# Patient Record
Sex: FEMALE | Race: BLACK OR AFRICAN AMERICAN | URBAN - METROPOLITAN AREA
[De-identification: names, ages, dates, MRNs, and addresses within clinical notes are randomized per-mention and may not be internally consistent; named-entity substitution may affect disease eponyms.]

---

## 2017-02-02 ENCOUNTER — INPATIENT (INPATIENT)
Facility: HOSPITAL | Age: 52
LOS: 7 days | Discharge: NURSING, REHAB OR LONG-TERM CARE | DRG: 64 | End: 2017-02-10
Attending: PSYCHIATRY & NEUROLOGY | Admitting: PSYCHIATRY & NEUROLOGY
Payer: COMMERCIAL

## 2017-02-02 VITALS
RESPIRATION RATE: 18 BRPM | OXYGEN SATURATION: 97 % | TEMPERATURE: 98 F | SYSTOLIC BLOOD PRESSURE: 156 MMHG | DIASTOLIC BLOOD PRESSURE: 96 MMHG | HEART RATE: 93 BPM

## 2017-02-02 DIAGNOSIS — Z41.8 ENCOUNTER FOR OTHER PROCEDURES FOR PURPOSES OTHER THAN REMEDYING HEALTH STATE: ICD-10-CM

## 2017-02-02 DIAGNOSIS — R41.82 ALTERED MENTAL STATUS, UNSPECIFIED: ICD-10-CM

## 2017-02-02 DIAGNOSIS — R63.8 OTHER SYMPTOMS AND SIGNS CONCERNING FOOD AND FLUID INTAKE: ICD-10-CM

## 2017-02-02 DIAGNOSIS — N19 UNSPECIFIED KIDNEY FAILURE: ICD-10-CM

## 2017-02-02 LAB
ALBUMIN SERPL ELPH-MCNC: 4 G/DL — SIGNIFICANT CHANGE UP (ref 3.4–5)
ALP SERPL-CCNC: 119 U/L — SIGNIFICANT CHANGE UP (ref 40–120)
ALT FLD-CCNC: 41 U/L — SIGNIFICANT CHANGE UP (ref 12–42)
AMMONIA BLD-MCNC: 10 UMOL/L — LOW (ref 11–32)
ANION GAP SERPL CALC-SCNC: 15 MMOL/L — SIGNIFICANT CHANGE UP (ref 9–16)
APPEARANCE UR: CLEAR — SIGNIFICANT CHANGE UP
APPEARANCE UR: SIGNIFICANT CHANGE UP
AST SERPL-CCNC: 41 U/L — HIGH (ref 15–37)
BACTERIA # UR AUTO: PRESENT /HPF
BILIRUB SERPL-MCNC: 0.9 MG/DL — SIGNIFICANT CHANGE UP (ref 0.2–1.2)
BILIRUB UR-MCNC: (no result)
BILIRUB UR-MCNC: NEGATIVE — SIGNIFICANT CHANGE UP
BUN SERPL-MCNC: 59 MG/DL — HIGH (ref 7–23)
CALCIUM SERPL-MCNC: 9.6 MG/DL — SIGNIFICANT CHANGE UP (ref 8.5–10.5)
CHLORIDE SERPL-SCNC: 100 MMOL/L — SIGNIFICANT CHANGE UP (ref 96–108)
CO2 SERPL-SCNC: 22 MMOL/L — SIGNIFICANT CHANGE UP (ref 22–31)
COLOR SPEC: YELLOW — SIGNIFICANT CHANGE UP
COLOR SPEC: YELLOW — SIGNIFICANT CHANGE UP
COMMENT - URINE: SIGNIFICANT CHANGE UP
CREAT ?TM UR-MCNC: 190 MG/DL — SIGNIFICANT CHANGE UP
CREAT SERPL-MCNC: 3.75 MG/DL — HIGH (ref 0.5–1.3)
DIFF PNL FLD: (no result)
DIFF PNL FLD: SIGNIFICANT CHANGE UP
EPI CELLS # UR: SIGNIFICANT CHANGE UP /HPF
ETHANOL SERPL-MCNC: <3 MG/DL — SIGNIFICANT CHANGE UP
GLUCOSE SERPL-MCNC: 109 MG/DL — HIGH (ref 70–99)
GLUCOSE UR QL: NEGATIVE MG/DL — SIGNIFICANT CHANGE UP
GLUCOSE UR QL: NEGATIVE — SIGNIFICANT CHANGE UP
GRAN CASTS # UR COMP ASSIST: (no result) /LPF
HCT VFR BLD CALC: 31.4 % — LOW (ref 34.5–45)
HGB BLD-MCNC: 10.1 G/DL — LOW (ref 11.5–15.5)
HYALINE CASTS # UR AUTO: (no result) /LPF
KETONES UR-MCNC: (no result) MG/DL
KETONES UR-MCNC: NEGATIVE MG/DL — SIGNIFICANT CHANGE UP
LEUKOCYTE ESTERASE UR-ACNC: NEGATIVE — SIGNIFICANT CHANGE UP
LEUKOCYTE ESTERASE UR-ACNC: NEGATIVE — SIGNIFICANT CHANGE UP
MAGNESIUM SERPL-MCNC: 2 MG/DL — SIGNIFICANT CHANGE UP (ref 1.6–2.4)
MCHC RBC-ENTMCNC: 28.7 PG — SIGNIFICANT CHANGE UP (ref 27–34)
MCHC RBC-ENTMCNC: 32.2 G/DL — SIGNIFICANT CHANGE UP (ref 32–36)
MCV RBC AUTO: 89.2 FL — SIGNIFICANT CHANGE UP (ref 80–100)
NITRITE UR-MCNC: NEGATIVE — SIGNIFICANT CHANGE UP
NITRITE UR-MCNC: NEGATIVE — SIGNIFICANT CHANGE UP
OSMOLALITY UR: 559 MOSMOL/KG — SIGNIFICANT CHANGE UP (ref 100–650)
PCP SPEC-MCNC: SIGNIFICANT CHANGE UP
PH UR: 5 — SIGNIFICANT CHANGE UP (ref 4–8.1)
PH UR: 5.5 — SIGNIFICANT CHANGE UP (ref 4–8)
PHOSPHATE SERPL-MCNC: 6 MG/DL — HIGH (ref 2.5–4.5)
PLATELET # BLD AUTO: 308 K/UL — SIGNIFICANT CHANGE UP (ref 150–400)
POTASSIUM SERPL-MCNC: 4.1 MMOL/L — SIGNIFICANT CHANGE UP (ref 3.5–5.3)
POTASSIUM SERPL-SCNC: 4.1 MMOL/L — SIGNIFICANT CHANGE UP (ref 3.5–5.3)
PROT SERPL-MCNC: 8.9 G/DL — HIGH (ref 6.4–8.2)
PROT UR-MCNC: 30 MG/DL
PROT UR-MCNC: 30 MG/DL — SIGNIFICANT CHANGE UP
RBC # BLD: 3.52 M/UL — LOW (ref 3.8–5.2)
RBC # FLD: 21.2 % — HIGH (ref 10.3–16.9)
RBC CASTS # UR COMP ASSIST: < 5 /HPF — SIGNIFICANT CHANGE UP
SODIUM SERPL-SCNC: 137 MMOL/L — SIGNIFICANT CHANGE UP (ref 132–145)
SODIUM UR-SCNC: 49 MMOL/L — SIGNIFICANT CHANGE UP
SP GR SPEC: 1.02 — SIGNIFICANT CHANGE UP (ref 1–1.03)
SP GR SPEC: >=1.03 — SIGNIFICANT CHANGE UP (ref 1–1.03)
TSH SERPL-MCNC: 0.83 UIU/ML — SIGNIFICANT CHANGE UP (ref 0.36–3.74)
UROBILINOGEN FLD QL: 1 E.U./DL — SIGNIFICANT CHANGE UP
UROBILINOGEN FLD QL: 1 E.U./DL — SIGNIFICANT CHANGE UP
WBC # BLD: 10.1 K/UL — SIGNIFICANT CHANGE UP (ref 3.8–10.5)
WBC # FLD AUTO: 10.1 K/UL — SIGNIFICANT CHANGE UP (ref 3.8–10.5)
WBC UR QL: < 5 /HPF — SIGNIFICANT CHANGE UP

## 2017-02-02 PROCEDURE — 72170 X-RAY EXAM OF PELVIS: CPT | Mod: 26

## 2017-02-02 PROCEDURE — 71010: CPT | Mod: 26

## 2017-02-02 PROCEDURE — 70551 MRI BRAIN STEM W/O DYE: CPT | Mod: 26

## 2017-02-02 PROCEDURE — 73630 X-RAY EXAM OF FOOT: CPT | Mod: 26,RT

## 2017-02-02 PROCEDURE — 99291 CRITICAL CARE FIRST HOUR: CPT | Mod: 25

## 2017-02-02 PROCEDURE — 70450 CT HEAD/BRAIN W/O DYE: CPT | Mod: 26

## 2017-02-02 RX ORDER — ATORVASTATIN CALCIUM 80 MG/1
80 TABLET, FILM COATED ORAL AT BEDTIME
Qty: 0 | Refills: 0 | Status: DISCONTINUED | OUTPATIENT
Start: 2017-02-02 | End: 2017-02-06

## 2017-02-02 RX ORDER — VANCOMYCIN HCL 1 G
1500 VIAL (EA) INTRAVENOUS ONCE
Qty: 0 | Refills: 0 | Status: COMPLETED | OUTPATIENT
Start: 2017-02-02 | End: 2017-02-02

## 2017-02-02 RX ORDER — LEVETIRACETAM 250 MG/1
1000 TABLET, FILM COATED ORAL ONCE
Qty: 0 | Refills: 0 | Status: COMPLETED | OUTPATIENT
Start: 2017-02-02 | End: 2017-02-02

## 2017-02-02 RX ORDER — SODIUM CHLORIDE 9 MG/ML
1000 INJECTION INTRAMUSCULAR; INTRAVENOUS; SUBCUTANEOUS ONCE
Qty: 0 | Refills: 0 | Status: COMPLETED | OUTPATIENT
Start: 2017-02-02 | End: 2017-02-02

## 2017-02-02 RX ORDER — VANCOMYCIN HCL 1 G
1000 VIAL (EA) INTRAVENOUS ONCE
Qty: 0 | Refills: 0 | Status: DISCONTINUED | OUTPATIENT
Start: 2017-02-02 | End: 2017-02-02

## 2017-02-02 RX ADMIN — ATORVASTATIN CALCIUM 80 MILLIGRAM(S): 80 TABLET, FILM COATED ORAL at 22:05

## 2017-02-02 RX ADMIN — Medication 300 MILLIGRAM(S): at 12:28

## 2017-02-02 RX ADMIN — SODIUM CHLORIDE 1000 MILLILITER(S): 9 INJECTION INTRAMUSCULAR; INTRAVENOUS; SUBCUTANEOUS at 13:58

## 2017-02-02 RX ADMIN — SODIUM CHLORIDE 1000 MILLILITER(S): 9 INJECTION INTRAMUSCULAR; INTRAVENOUS; SUBCUTANEOUS at 11:05

## 2017-02-02 RX ADMIN — LEVETIRACETAM 440 MILLIGRAM(S): 250 TABLET, FILM COATED ORAL at 12:00

## 2017-02-02 NOTE — H&P ADULT. - HISTORY OF PRESENT ILLNESS
History obtained from charts as pt has limited communication ability.     50 yo F w/ unknown PMH was BIBEMS to [facility] after being found leaning against a wall at [facility] for 3.5 hrs.  Pt has incomprehensible speech w/ word salad.  CT head which revealed a left thalamic hemorrhage with local edema and mass effect likely to be 2/2 hypertensive angiopathy. History obtained from charts as pt has limited communication ability.     52 yo F w/ unknown PMH was BIBEMS to [facility] after being found leaning against a wall at [facility] for 3.5 hrs.  Pt has occasional incomprehensible speech w/ word salad.  CT head which revealed a left thalamic hemorrhage with local edema and mass effect likely to be 2/2 hypertensive angiopathy.  Pt c/o R foot pain.  Unable to elaborate further.  She specifically denies nausea, vomiting, CP and SOB.  Pt is unable to name any family members or her home address.

## 2017-02-02 NOTE — H&P ADULT. - PROBLEM SELECTOR PLAN 4
Holding HSQ in setting of left thalamic hemorrhage  Dispo: Will keep on [address] for q 4 neurochecks

## 2017-02-02 NOTE — ED PROVIDER NOTE - PROGRESS NOTE DETAILS
case discussed with neurosurgery- they think it is a hypertensive bleed from 1-2 weeks, rec Keppra load, no steroid and medicine/neuro admission to SDU. Getting urine via straight cath. Labs also show renal failure. They also think the affective changes are unlikely related. No clear motor deficit, but she has trouble shifting in bed. Case accepted by Dr. Barakat of neuro for SDU. Neuro feel that affect and aphasia may be related to stroke syndrome. Getting IV vanc for poss foot cellulitis. Utox and alc neg. Gave address a [place] which dn make sense). PLUMMER- seems to be a bit weaker on R side (leg). [facility] called her insurance and her address is on [address] [address] [facility]. is in [place]. See [facility] note for details and emergency contact (who is not answering her calls).

## 2017-02-02 NOTE — H&P ADULT. - NEUROLOGICAL COMMENTS
A&O x 1, R tongue deviation, shoulder shrug intact, R pronator drift, 5/5 RUE motor strength, 3/5 LUE motor strength, pt not fully cooperative w/ exam.

## 2017-02-02 NOTE — H&P ADULT. - PROBLEM SELECTOR PLAN 1
CT head significant for left thalamic hemorrhage with local edema and mass effect.  - Bedrest  - C/w 80 lipitor qhs  - Neuro checks q 4 h  - MRA head w/o cont  - MRI head w/o cont  - US duplex carotid arteries

## 2017-02-02 NOTE — H&P ADULT. - ASSESSMENT
52 yo F w/ unknown PMH found to have left thalamic hemorrhage with local edema and mass effect on CT head.

## 2017-02-02 NOTE — ED BEHAVIORAL HEALTH NOTE - BEHAVIORAL HEALTH NOTE
SW was called to ED by MD to assist with a possible homeless women. Patient is a 51 single  female with altered mental status, she is somewhat dishevelled in her appearance. Per MD and note she was noted to [place] wall [place] and was brought to the ED via EMS and stated that she did drink ETOH last night. Per MD she also denied any drugs, mental health diagnosis and trails off in an incoherent world salad manner.  She did have a clear toxidrome. Worker attempted to speak with patient several times but patient was very drowsy and feel back asleep seconds after waking up. She was unable to give any information.   Worker Called the Emergency contact Thuy Saldivar ([phone]) listed on the information sheet given to staff several times. Was unable to reach her and leave a message. Patients insurance company [employer] provider line was called at [phone] but they were unable to release any information. The  did try and look up any emergency contact information for the patient, but she did not have anyone listed besides herself. Patients listed employer, the [employer] at [phone] were also called to help verify information but they to were unable to verify anything. Patients address on file is listed as [address] [place] [address]. However no [facility] number was able to be found to verify if patient still lives in this Apartment. Patient has 2 numbers listed, 1 being [phone] which is disconnected and [phone] which no longer belongs to her for about a year. Worker Unclear at this point if patient is homeless, has mental illness or just an acute MS change. Worker to continue to provide assistance as needed.

## 2017-02-02 NOTE — ED PROVIDER NOTE - OBJECTIVE STATEMENT
Patient was BIBE for presumed public intoxication. She was noted to [place] leaning [place] wall [place] 3[place] h [place]. She was complaining of R food pain and even took off her shoe to show EMS. Told nursing that she drank EtOH last night, denied this to me and when asked if she used drugs or EtOH mumbled that she drank tea and had peppermint patties. Also mentioned being at her sisters yesterday then trailed off in an incoherent word salad manner mentioning something about fish. Then denied drugs/etoh or PMHX or mental health dx. Foot pain is R outer forefoot, unclear if she fell. Foul smelling and somewhat dishevelled. Unclear if she is homeless or if she has had a subacute MS change or is disorganized from severe mental illness. Dn appear to have a clear toxidrome.  Somewhat drowsy, fully rousable, no other pain, no n/v and no obvious trauma (aside from foot pain) or smells of incontinence.

## 2017-02-02 NOTE — ED ADULT TRIAGE NOTE - CHIEF COMPLAINT QUOTE
[place] wall for several hours. states she had several drinks last night and lives [place]. awake alert self and place

## 2017-02-02 NOTE — ED PROVIDER NOTE - MEDICAL DECISION MAKING DETAILS
Patient ehre with unusual AMS, l thalamic beed (looks ubacute) with edema and mass effect [id number]), also in renal failure- ? acute or acute on chronic. Getting hydration, vanc for poss foot cellulitis and Keppra. Admitted to SDU at [facility]. NSGY aware and admitted to stroke svc. Will check CXR and Pelvis to exclude other occult pathology. Unclear how clearly patient comprehends diagnosis or admission 2/2 AMS.

## 2017-02-02 NOTE — ED PROVIDER NOTE - CARE PLAN
Principal Discharge DX:	Nontraumatic intracerebral hemorrhage, unspecified cerebral location, unspecified laterality Principal Discharge DX:	Nontraumatic intracerebral hemorrhage, unspecified cerebral location, unspecified laterality  Secondary Diagnosis:	Altered mental status, unspecified altered mental status type  Secondary Diagnosis:	Renal failure

## 2017-02-03 LAB
ANION GAP SERPL CALC-SCNC: 10 MMOL/L — SIGNIFICANT CHANGE UP (ref 9–16)
APTT BLD: 24.7 SEC — LOW (ref 27.5–37.4)
BUN SERPL-MCNC: 49 MG/DL — HIGH (ref 7–23)
CALCIUM SERPL-MCNC: 8.2 MG/DL — LOW (ref 8.5–10.5)
CHLORIDE SERPL-SCNC: 107 MMOL/L — SIGNIFICANT CHANGE UP (ref 96–108)
CHOLEST SERPL-MCNC: 113 MG/DL — SIGNIFICANT CHANGE UP
CO2 SERPL-SCNC: 23 MMOL/L — SIGNIFICANT CHANGE UP (ref 22–31)
CREAT SERPL-MCNC: 1.6 MG/DL — HIGH (ref 0.5–1.3)
CRP SERPL-MCNC: 7.42 MG/DL — HIGH
ERYTHROCYTE [SEDIMENTATION RATE] IN BLOOD: 55 MM/HR — HIGH
FIBRINOGEN PPP-MCNC: 387 MG/DL — SIGNIFICANT CHANGE UP (ref 258–438)
GLUCOSE SERPL-MCNC: 81 MG/DL — SIGNIFICANT CHANGE UP (ref 70–99)
HBA1C BLD-MCNC: 5 % — SIGNIFICANT CHANGE UP (ref 4.8–5.6)
HCT VFR BLD CALC: 27.9 % — LOW (ref 34.5–45)
HDLC SERPL-MCNC: 34 MG/DL — LOW
HGB BLD-MCNC: 8.7 G/DL — LOW (ref 11.5–15.5)
INR BLD: 1.17 — HIGH (ref 0.88–1.16)
LIPID PNL WITH DIRECT LDL SERPL: 67 MG/DL — SIGNIFICANT CHANGE UP
MAGNESIUM SERPL-MCNC: 2.2 MG/DL — SIGNIFICANT CHANGE UP (ref 1.6–2.4)
MCHC RBC-ENTMCNC: 27.5 PG — SIGNIFICANT CHANGE UP (ref 27–34)
MCHC RBC-ENTMCNC: 31.2 G/DL — LOW (ref 32–36)
MCV RBC AUTO: 88.3 FL — SIGNIFICANT CHANGE UP (ref 80–100)
PHOSPHATE SERPL-MCNC: 3.8 MG/DL — SIGNIFICANT CHANGE UP (ref 2.5–4.5)
PLATELET # BLD AUTO: 267 K/UL — SIGNIFICANT CHANGE UP (ref 150–400)
POTASSIUM SERPL-MCNC: 3.3 MMOL/L — LOW (ref 3.5–5.3)
POTASSIUM SERPL-SCNC: 3.3 MMOL/L — LOW (ref 3.5–5.3)
PROTHROM AB SERPL-ACNC: 13 SEC — SIGNIFICANT CHANGE UP (ref 10–13.1)
RBC # BLD: 3.16 M/UL — LOW (ref 3.8–5.2)
RBC # FLD: 21.2 % — HIGH (ref 10.3–16.9)
SODIUM SERPL-SCNC: 140 MMOL/L — SIGNIFICANT CHANGE UP (ref 135–145)
TOTAL CHOLESTEROL/HDL RATIO MEASUREMENT: 3.3 RATIO — SIGNIFICANT CHANGE UP
TRIGL SERPL-MCNC: 60 MG/DL — SIGNIFICANT CHANGE UP
WBC # BLD: 6.7 K/UL — SIGNIFICANT CHANGE UP (ref 3.8–10.5)
WBC # FLD AUTO: 6.7 K/UL — SIGNIFICANT CHANGE UP (ref 3.8–10.5)

## 2017-02-03 PROCEDURE — 76775 US EXAM ABDO BACK WALL LIM: CPT | Mod: 26

## 2017-02-03 PROCEDURE — 93880 EXTRACRANIAL BILAT STUDY: CPT | Mod: 26

## 2017-02-03 PROCEDURE — 93306 TTE W/DOPPLER COMPLETE: CPT | Mod: 26

## 2017-02-03 RX ORDER — POTASSIUM CHLORIDE 20 MEQ
10 PACKET (EA) ORAL
Qty: 0 | Refills: 0 | Status: COMPLETED | OUTPATIENT
Start: 2017-02-03 | End: 2017-02-03

## 2017-02-03 RX ADMIN — Medication 100 MILLIEQUIVALENT(S): at 10:34

## 2017-02-03 RX ADMIN — ATORVASTATIN CALCIUM 80 MILLIGRAM(S): 80 TABLET, FILM COATED ORAL at 22:10

## 2017-02-03 RX ADMIN — Medication 100 MILLIEQUIVALENT(S): at 17:30

## 2017-02-03 RX ADMIN — Medication 100 MILLIEQUIVALENT(S): at 14:23

## 2017-02-03 NOTE — SWALLOW BEDSIDE ASSESSMENT ADULT - ADDITIONAL RECOMMENDATIONS
Patient received in bed.  Alert, able to follow some commands.  Language screen completed.  Pt. does not present as aphasic.  20/20 CNT.  Verbal output more c/w cognitive issues. Pt. tangential, confused.  Unclear of pt.'s premorbid status - ? psych hx.  Speech/voice is clear and intelligible.  OM exam revealed structure and function WNL.  Adequate dentition.  Pt. accepted ~3 oz of thin liquids and a cracker.  Oral phase marked by adequate receipt, containment and clearance.  Pharyngeal swallow trigger appeared timely and laryngeal elevation adequate per palpation.  Pt. was without overt s/s of aspiration.  Pt. can resume a regular-textured diet at this time.

## 2017-02-03 NOTE — OCCUPATIONAL THERAPY INITIAL EVALUATION ADULT - VISUAL ASSESSMENT: TRACKING
left to right/up gaze/right to left/all of the above grossly intact; multiple cues to keep focus on task/down gaze/normal

## 2017-02-03 NOTE — OCCUPATIONAL THERAPY INITIAL EVALUATION ADULT - PLANNED THERAPY INTERVENTIONS, OT EVAL
cognitive, visual perceptual/bed mobility training/transfer training/ADL retraining/balance training

## 2017-02-03 NOTE — PHYSICAL THERAPY INITIAL EVALUATION ADULT - ADDITIONAL COMMENTS
Pt. reports currently staying with her daughters in [place], + elevator access. Pt. denies using an  assistive device prior to admission. Pt. is an unreliable historian.

## 2017-02-03 NOTE — CHART NOTE - NSCHARTNOTEFT_GEN_A_CORE
Upon Nutritional Assessment by the Registered Dietitian your patient was determined to meet criteria / has evidence of the following diagnosis/diagnoses:          [ ]  Mild Protein Calorie Malnutrition        [ ]  Moderate Protein Calorie Malnutrition        [ ] Severe Protein Calorie Malnutrition        [ ] Unspecified Protein Calorie Malnutrition        [ ] Underweight / BMI <19        [X ] Morbid Obesity / BMI > 40      Findings as based on:  •  Comprehensive nutrition assessment and consultation  •  Calorie counts (nutrient intake analysis)  •  Food acceptance and intake status from observations by staff  •  Follow up  •  Patient education  •  Intervention secondary to interdisciplinary rounds  •   concerns      Treatment:    The following diet has been recommended:      PROVIDER Section:     By signing this assessment you are acknowledging and agree with the diagnosis/diagnoses assigned by the Registered Dietitian    Comments:

## 2017-02-03 NOTE — DIETITIAN INITIAL EVALUATION ADULT. - OTHER INFO
Pt s/p CT head which revealed a left thalamic hemorrhage with local edema and mass effect likely to be 2/2 hypertensive angiopathy. Limited pt hx obtained due to AMS. Pt was lethargic during assessment and was fading in and out during questioning. PTA pt reports consuming a regular diet of three meals a day w/ no restrictions. No diet currently ordered for pt. No GI distress; pt cannot recall last BM. Skin WDL except red BL LE.

## 2017-02-03 NOTE — OCCUPATIONAL THERAPY INITIAL EVALUATION ADULT - GENERAL OBSERVATIONS, REHAB EVAL
Right hand dominant. Patient cleared for Occupational Therapy by  RN Right hand dominant. Patient cleared for Occupational Therapy by Dr. Busch and RN Jose. Patient received seated up at edge of bed in non-acute distress with PT (Flora Singh), +EKG, +IV heplock. Patient reports pain bilateral lower extremities with standing position.

## 2017-02-03 NOTE — PROGRESS NOTE ADULT - SUBJECTIVE AND OBJECTIVE BOX
Stroke Neurology Follow-Up Visit    Overnight with episodes of nonsustained ST with HR in 120's-130's. Resolved on own. Asymptomatic.  Pt seen and examined. More awake and interactive today, but still appears to have cognitive impairment.   No complaints at this time. No headache, nausea, vomiting, new weakness, or numbness.     Exam:  T(F): 97.6, Max: 98.6 (02-03 @ 01:00)  HR: 92 (68 - 92)  BP: 135/77 (113/59 - 152/77)  RR: 14 (14 - 18 )  SpO2: 96% (95% - 98%)    Gen: Lying in bed, calm, in NAD  Neuro:  Mental status: Awake, alert and oriented x2 directly to self, age, month, year. Unable to state location. Was unable to name current president or president prior. Follows commands, but with delay at times. Unable to recall events that occurred yesterday. Mild dysarthria. Speech is fluent, but slow and delayed. Soft voice.  ?expressive aphasia  Cranial nerves: Pupils equally round and reactive to light, 3 mm. Visual fields full, 1-2 beat nystagmus with far left gaze, extraocular muscles intact, V1 through V3 intact bilaterally and symmetric, no facial droop, hearing intact to finger rub, palate elevation symmetric, tongue was midline, sternocleidomastoid/shoulder shrug strength bilaterally 5/5.    Motor:  No pronator drift.  strength strong bilaterally. RUE 5/5. LUE 5/5. Poor effort given when testing LEs. RLE 4/5. LLE 4/5.   Sensation: Intact and symmetric of light touch.  No neglect.   Coordination: No dysmetria on finger-to-nose.     MEDICATIONS  (STANDING):  atorvastatin 80milliGRAM(s) Oral at bedtime  potassium chloride  10 mEq/100 mL IVPB 10milliEquivalent(s) IV Intermittent every 1 hour    MEDICATIONS  (PRN):      Labs:                        8.7    6.7   )-----------( 267      ( 03 Feb 2017 06:34 )             27.9     03 Feb 2017 06:34    140    |  107    |  49     ----------------------------<  81     3.3     |  23     |  1.60     Ca    8.2        03 Feb 2017 06:34  Phos  3.8       03 Feb 2017 06:34  Mg     2.2       03 Feb 2017 06:34    TPro  8.9    /  Alb  4.0    /  TBili  0.9    /  DBili  x      /  AST  41     /  ALT  41     /  AlkPhos  119    02 Feb 2017 11:05    LIVER FUNCTIONS - ( 02 Feb 2017 11:05 )  Alb: 4.0 g/dL / Pro: 8.9 g/dL / ALK PHOS: 119 U/L / ALT: 41 U/L / AST: 41 U/L / GGT: x           Cholesterol, Serum: 113 mg/dL  HDL Cholesterol, Serum: 34 mg/dL  Triglycerides, Serum: 60 mg/dL  LDL 67   Hemoglobin A1C, Whole Blood: 5.0 %  Thyroid Stimulating Hormone, Serum: 0.832 uIU/mL    UTox negative     Radiology:  2/2 CT head:   IMPRESSION: Left thalamic hemorrhage with local edema and mass effect. This is felt most likely to be secondary to hypertensive angiopathy given that there are other more chronic sites of small vessel lacunar injury with advanced cerebral white matter disease. Clinical correlation is suggested.  Follow-up to resolution is recommended to exclude alternate etiologies.    2/2 MRI head:   IMPRESSION:  Left thalamic hematoma with surrounding parenchymal edema and expansion of the left thalamus as further described above.    Microangiopathic ischemic disease. Chronic bilateral basal ganglia and pontine infarcts.    No hydrocephalus or acute vascular territorial infarct.    Assessment & Plan:  51 year old female with unknown PMH presented to [facility] after being found leaning against a wall at [facility] for 3.5 hours with slurred speech. Found to have left thalamic hemorrhage with edema and mass effect.     1) a. Stroke work up  -etiology of hemorrhagic stroke likely secondary to small vessel disease/hypertensive angiopathy   -MRI head with left thalamic hemorrhage with surrounding edema, also with chronic b/l basal ganglia and pontine infarcts and microangiopathic ischemic disease-suggestive of long standing vascular risk factors  -pending ultrasound carotid arteries to assess for occlusion or stenosis   -pending ECHO w/ bubble to assess for cardiac function, can possibly assess for long standing uncontrolled HTN  -passed bedside dysphagia screen-ok to start on PO diet   -PT/OT/ST     b. Secondary stroke prevention  -antiplatelets contraindicated due to bleed  -continue on atorvastatin 80 mg-etiology of stroke likely secondary to small vessel disease so would benefit from high dose statin    2) DVT prophylaxis  -holding HSQ in the setting of bleed  -intermittent pneumatic compression devices on Stroke Neurology Follow-Up Visit    Overnight with episodes of nonsustained ST with HR in 120's-130's. Resolved on own. Asymptomatic.  Pt seen and examined. More awake and interactive today, but still appears to have cognitive impairment.   No complaints at this time. No headache, nausea, vomiting, new weakness, or numbness.     Exam:  T(F): 97.6, Max: 98.6 (02-03 @ 01:00)  HR: 92 (68 - 92)  BP: 135/77 (113/59 - 152/77)  RR: 14 (14 - 18 )  SpO2: 96% (95% - 98%)    Gen: Lying in bed, calm, in NAD  Neuro:  Mental status: Awake, alert and oriented x2 directly to self, age, month, year. Unable to state location. Was unable to name current president or president prior. Follows commands, but with delay at times. Unable to recall events that occurred yesterday. Mild dysarthria. Speech is fluent, but slow and delayed. Soft voice.    Cranial nerves: Pupils equally round and reactive to light, 3 mm. Visual fields full, 1-2 beat nystagmus with far left gaze, extraocular muscles intact, V1 through V3 intact bilaterally and symmetric, no facial droop, hearing intact to finger rub, palate elevation symmetric, tongue was midline, sternocleidomastoid/shoulder shrug strength bilaterally 5/5.    Motor:  No pronator drift.  strength strong bilaterally. RUE 5/5. LUE 5/5. Poor effort given when testing LEs. RLE 4/5. LLE 4/5.   Sensation: Intact and symmetric of light touch.  No neglect.   Coordination: No dysmetria on finger-to-nose.     MEDICATIONS  (STANDING):  atorvastatin 80milliGRAM(s) Oral at bedtime  potassium chloride  10 mEq/100 mL IVPB 10milliEquivalent(s) IV Intermittent every 1 hour    Labs:                        8.7    6.7   )-----------( 267      ( 03 Feb 2017 06:34 )             27.9     03 Feb 2017 06:34    140    |  107    |  49     ----------------------------<  81     3.3     |  23     |  1.60     Ca    8.2        03 Feb 2017 06:34  Phos  3.8       03 Feb 2017 06:34  Mg     2.2       03 Feb 2017 06:34    TPro  8.9    /  Alb  4.0    /  TBili  0.9    /  DBili  x      /  AST  41     /  ALT  41     /  AlkPhos  119    02 Feb 2017 11:05    LIVER FUNCTIONS - ( 02 Feb 2017 11:05 )  Alb: 4.0 g/dL / Pro: 8.9 g/dL / ALK PHOS: 119 U/L / ALT: 41 U/L / AST: 41 U/L / GGT: x           Cholesterol, Serum: 113 mg/dL  HDL Cholesterol, Serum: 34 mg/dL  Triglycerides, Serum: 60 mg/dL  LDL 67   Hemoglobin A1C, Whole Blood: 5.0 %  Thyroid Stimulating Hormone, Serum: 0.832 uIU/mL    UTox negative     Radiology:  2/2 CT head:   IMPRESSION: Left thalamic hemorrhage with local edema and mass effect. This is felt most likely to be secondary to hypertensive angiopathy given that there are other more chronic sites of small vessel lacunar injury with advanced cerebral white matter disease. Clinical correlation is suggested.  Follow-up to resolution is recommended to exclude alternate etiologies.    2/2 MRI head:   IMPRESSION:  1. Left thalamic hematoma with surrounding parenchymal edema and expansion of the left thalamus as further described above.  2. Microangiopathic ischemic disease. Chronic bilateral basal ganglia and pontine infarcts.  3. No hydrocephalus or acute vascular territorial infarct.    Assessment & Plan:  51 year old female with unknown PMH presented to [facility] after being found leaning against a wall at [facility] for 3.5 hours with slurred speech. Found to have left thalamic hemorrhage with edema and mass effect.     1) a. Stroke work up  -etiology of hemorrhagic stroke likely secondary to small vessel disease/hypertensive angiopathy   -MRI head with left thalamic hemorrhage with surrounding edema, also with chronic b/l basal ganglia and pontine infarcts and microangiopathic ischemic disease-suggestive of long standing vascular risk factors    -pending ultrasound carotid arteries to assess for occlusion or stenosis   -pending ECHO w/ bubble to assess for cardiac function, can possibly assess for long standing uncontrolled HTN  -passed bedside dysphagia screen-ok to start on PO diet   -PT/OT  - Speech therapy to evaluate if aphasic versus cognitive effects on her speech output    b. Secondary stroke prevention  -antiplatelet and anticoagulant contraindicated due to intracerebral hemorrhage  -continue on atorvastatin 80 mg-etiology of stroke likely secondary to small vessel disease so would benefit from high dose statin    2) DVT prophylaxis  -holding HSQ in the setting of bleed  -intermittent pneumatic compression devices on her legs    3) Obtain further collateral information regarding her medical history now that she seems more appropriate. Stroke Neurology Follow-Up Visit    Overnight with episodes of nonsustained ST with HR in 120's-130's. Resolved on own. Asymptomatic.  Pt seen and examined. More awake and interactive today, but still appears to have cognitive impairment.   No complaints at this time. No headache, nausea, vomiting, new weakness, or numbness.     Exam:  T(F): 97.6, Max: 98.6 (02-03 @ 01:00)  HR: 92 (68 - 92)  BP: 135/77 (113/59 - 152/77)  RR: 14 (14 - 18 )  SpO2: 96% (95% - 98%)    Gen: Lying in bed, calm, in NAD  Neuro:  Mental status: Awake, alert and oriented x2 directly to self, age, month, year. Unable to state location. Was unable to name current president or president prior. Follows commands, but with delay at times. Unable to recall events that occurred yesterday. Mild dysarthria. Speech is fluent, but slow and delayed. Soft voice.    Cranial nerves: Pupils equally round and reactive to light, 3 mm. Visual fields full, 1-2 beat nystagmus with far left gaze, extraocular muscles intact, V1 through V3 intact bilaterally and symmetric, no facial droop, hearing intact to finger rub, palate elevation symmetric, tongue was midline, sternocleidomastoid/shoulder shrug strength bilaterally 5/5.    Motor:  No pronator drift.  strength strong bilaterally. RUE 5/5. LUE 5/5. Poor effort given when testing LEs. RLE 4/5. LLE 4/5.   Sensation: Intact and symmetric of light touch.  No neglect.   Coordination: No dysmetria on finger-to-nose.     MEDICATIONS  (STANDING):  atorvastatin 80milliGRAM(s) Oral at bedtime  potassium chloride  10 mEq/100 mL IVPB 10milliEquivalent(s) IV Intermittent every 1 hour    Labs:                        8.7    6.7   )-----------( 267      ( 03 Feb 2017 06:34 )             27.9     03 Feb 2017 06:34    140    |  107    |  49     ----------------------------<  81     3.3     |  23     |  1.60     Ca    8.2        03 Feb 2017 06:34  Phos  3.8       03 Feb 2017 06:34  Mg     2.2       03 Feb 2017 06:34    TPro  8.9    /  Alb  4.0    /  TBili  0.9    /  DBili  x      /  AST  41     /  ALT  41     /  AlkPhos  119    02 Feb 2017 11:05    LIVER FUNCTIONS - ( 02 Feb 2017 11:05 )  Alb: 4.0 g/dL / Pro: 8.9 g/dL / ALK PHOS: 119 U/L / ALT: 41 U/L / AST: 41 U/L / GGT: x           Cholesterol, Serum: 113 mg/dL  HDL Cholesterol, Serum: 34 mg/dL  Triglycerides, Serum: 60 mg/dL  LDL 67   Hemoglobin A1C, Whole Blood: 5.0 %  Thyroid Stimulating Hormone, Serum: 0.832 uIU/mL    UTox negative     Radiology:  2/2 CT head:   IMPRESSION: Left thalamic hemorrhage with local edema and mass effect. This is felt most likely to be secondary to hypertensive angiopathy given that there are other more chronic sites of small vessel lacunar injury with advanced cerebral white matter disease. Clinical correlation is suggested.  Follow-up to resolution is recommended to exclude alternate etiologies.    2/2 MRI head:   IMPRESSION:  1. Left thalamic hematoma with surrounding parenchymal edema and expansion of the left thalamus as further described above.  2. Microangiopathic ischemic disease. Chronic bilateral basal ganglia and pontine infarcts.  3. No hydrocephalus or acute vascular territorial infarct.    Assessment & Plan:  51 year old female with unknown PMH presented to [facility] after being found leaning against a wall at [place] for 3.5 hours with slurred speech. Found to have left thalamic hemorrhage with edema and mass effect.     1) a. Stroke work up  -etiology of hemorrhagic stroke likely secondary to small vessel disease/hypertensive angiopathy   -MRI head with left thalamic hemorrhage with surrounding edema, also with chronic b/l basal ganglia and pontine infarcts and microangiopathic ischemic disease-suggestive of long standing vascular risk factors  -She couldn't tolerate further MR so MRA Head not performed.  Given size of stroke, doubt that large vessel involvement    -pending ultrasound carotid arteries to assess for occlusion or stenosis   -pending ECHO w/ bubble to assess for cardiac function, can possibly assess for long standing uncontrolled HTN  -passed bedside dysphagia screen-ok to start on PO diet   -PT/OT  - Speech therapy to evaluate if aphasic versus cognitive effects on her speech output    b. Secondary stroke prevention  -antiplatelet and anticoagulant contraindicated due to intracerebral hemorrhage  -continue on atorvastatin 80 mg-etiology of stroke likely secondary to small vessel disease so would benefit from high dose statin    2) DVT prophylaxis  -holding HSQ in the setting of bleed  -intermittent pneumatic compression devices on her legs    3) Obtain further collateral information regarding her medical history now that she seems more appropriate.

## 2017-02-03 NOTE — PROGRESS NOTE ADULT - PROBLEM SELECTOR PLAN 4
Holding HSQ in setting of left thalamic hemorrhage  Dispo: Will keep on 7 [facility] for q 4 neurochecks Holding HSQ in setting of left thalamic hemorrhage  Avoid antiplatelets and anticoagulation in setting of ICH  Dispo: Will keep on 7 [facility] for q 4 neurochecks

## 2017-02-03 NOTE — OCCUPATIONAL THERAPY INITIAL EVALUATION ADULT - ORIENTATION, REHAB EVAL
time : "February 13th 2017", place : "[facility]" attempts to self correct to "[place]..."/person

## 2017-02-03 NOTE — SWALLOW BEDSIDE ASSESSMENT ADULT - SLP PERTINENT HISTORY OF CURRENT PROBLEM
Patient was found leaning against a wall at [place] x3.5 hours.  Premorbid status is unclear.

## 2017-02-03 NOTE — OCCUPATIONAL THERAPY INITIAL EVALUATION ADULT - LEVEL OF INDEPENDENCE: SIT/STAND, REHAB EVAL
unable to stay in standing position 2/2 c/o pain bilateral lower extremities/maximum assist (25% patients effort)

## 2017-02-03 NOTE — PROGRESS NOTE ADULT - PROBLEM SELECTOR PLAN 1
CT head significant for left thalamic hemorrhage with local edema and mass effect.  - Bedrest  - C/w 80 lipitor qhs  - Neuro checks q 4 h  - MRA head w/o cont  - MRI head w/o cont  - US duplex carotid arteries CT head significant for left thalamic hemorrhage with local edema and mass effect.  - Bedrest  - C/w 80 lipitor qhs  - Neuro checks q 4 h  - MRA head w/o cont  - MRI head w/o cont  - US duplex carotid arteries  - F/u dysphagia screen  - F/u med rec CT head significant for left thalamic hemorrhage with local edema and mass effect.  - C/w 80 lipitor qhs  - Neuro checks q 4 h  - Dc MRA head w/o cont - pt could not tolerate  - MRI head w/o cont  - US duplex carotid arteries  - Spoke w/ Dr. Kruger, pt's CT head significant for left thalamic hemorrhage with local edema and mass effect.  - C/w 80 lipitor qhs  - Neuro checks q 4 h  - Dc MRA head w/o cont - pt could not tolerate  - MRI head w/o cont  - US duplex carotid arteries  - Spoke w/ Dr. Kruger, pt's PCP, however, has not seen her since 2015.  Reports that she has long standing hx of HTN, EtoH abuse w/ abnormal LFT's, DVT previously on coumadin and some psychiatric condition.  She was non-compliant w/ medications.  - SBP goal < 140, currently in 130's

## 2017-02-03 NOTE — PHYSICAL THERAPY INITIAL EVALUATION ADULT - PERTINENT HX OF CURRENT PROBLEM, REHAB EVAL
Pt. is a 51 y.o. female admitted with altered mental status, expressive aphasia, was found leaning against the wall at [facility]. Pt. was found to have L thalamic hemorrhage.

## 2017-02-03 NOTE — OCCUPATIONAL THERAPY INITIAL EVALUATION ADULT - MANUAL MUSCLE TESTING RESULTS, REHAB EVAL
smile symmetrical, tongue protrusion in midline, cheeks puff and eyebrows elevation grossly intact; bilateral upper extremities symmetrical 4/5 shoulder flex/ext, 4+/5 elbow flex/ext, bilateral hand  5/5

## 2017-02-03 NOTE — DIETITIAN INITIAL EVALUATION ADULT. - NS AS NUTRI INTERV ED CONTENT
Nutrition relationship to health/disease/Purpose of the nutrition education/Provided brief healthful diet edu; pt denied written materials 2/2 to suspected non-compliance from AMS

## 2017-02-03 NOTE — OCCUPATIONAL THERAPY INITIAL EVALUATION ADULT - PERTINENT HX OF CURRENT PROBLEM, REHAB EVAL
52 yo F w/ unknown PMH was BIBEMS to [facility] after being found leaning against a wall at [facility] for 3.5 hrs.  Pt has occasional incomprehensible speech w/ word salad.  CT head which revealed a left thalamic hemorrhage with local edema and mass effect likely to be 2/2 hypertensive angiopathy.  Pt c/o R foot pain.  Unable to elaborate further.  She specifically denies nausea, vomiting, CP and SOB.  Pt is unable to name any family members or her home address. 52 yo F BIBEMS to [facility] after being found leaning against a wall at [facility] for 3.5 hrs. Pt has occasional incomprehensible speech w/ word salad. CT head which revealed a left thalamic hemorrhage with local edema and mass effect likely to be 2/2 hypertensive angiopathy.  Pt c/o R foot pain.  Unable to elaborate further.  She specifically denies nausea, vomiting, CP and SOB.  Pt is unable to name any family members or her home address.

## 2017-02-03 NOTE — PROGRESS NOTE ADULT - SUBJECTIVE AND OBJECTIVE BOX
52 yo F w/ unknown PMH was BIBEMS to [facility] after being found leaning against a wall at [facility] for 3.5 hrs.  Pt has occasional incomprehensible speech w/ word salad.  CT head which revealed a left thalamic hemorrhage with local edema and mass effect likely to be 2/2 hypertensive angiopathy.  Pt c/o R foot pain.  Unable to elaborate further.  She specifically denies nausea, vomiting, CP and SOB.  Pt is unable to name any family members or her home address.       Overnight Events: BP remained at goal.  As per monitor, since MN, patient had about 9 episodes of nonsustained sinus tachycardia 120-130 lasting 10 seconds each. 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 Patient HD stable. Asymtomatic. A&Ox1-2. Patient bladeer scan showed 630 cc residual. About to straight cath but later incontinence. Repeat bladder scan showing 20cc. Like cause of sinus tachy.    Subjective: Patient seen and examined at bedside.    [OBJECTIVE]:    Vital Signs:  T(F): 98.2, Max: 98.6 ( @ 01:00)  HR: 75 (68 - 93)  BP: 123/63 (113/59 - 159/67)  BP(mean): 86 (86 - 105)  RR: 18 (16 - 18)  SpO2: 96% (95% - 99%)  Wt(kg): --  CVP(cm H2O): --      I & Os for current day (as of  @ 07:10)  =============================================  IN: 0 ml / OUT: 500 ml / NET: -500 ml    CAPILLARY BLOOD GLUCOSE  109 (2017 11:03)        Medications:  MEDICATIONS  (STANDING):  atorvastatin 80milliGRAM(s) Oral at bedtime    MEDICATIONS  (PRN):      Allergies:    No Known Allergies    Intolerances        Labs:                        8.7    6.7   )-----------( 267      ( 2017 06:34 )             27.9     2017 06:34    140    |  107    |  49     ----------------------------<  81     3.3     |  23     |  1.60     Ca    8.2        2017 06:34  Phos  3.8       2017 06:34  Mg     2.2       2017 06:34    TPro  8.9    /  Alb  4.0    /  TBili  0.9    /  DBili  x      /  AST  41     /  ALT  41     /  AlkPhos  119    2017 11:05      Urinalysis Basic - ( 2017 19:45 )    Color: Yellow / Appearance: Slightly cloudy / S.025 / pH: x  Gluc: x / Ketone: Negative mg/dL  / Bili: Negative / Urobili: 1.0 E.U./dL   Blood: x / Protein: 30 mg/dL / Nitrite: Negative   Leuk Esterase: Negative / RBC: < 5 /HPF / WBC < 5 /HPF   Sq Epi: x / Non Sq Epi: Few /HPF / Bacteria: Present /HPF        Radiology and other tests: Hospital Course:  50 yo F w/ unknown PMH was BIBEMS to [facility] after being found leaning against a wall at [facility] for 3.5 hrs.  Pt has occasional incomprehensible speech w/ word salad.  CT head which revealed a left thalamic hemorrhage with local edema and mass effect likely to be 2/2 hypertensive angiopathy.  Pt c/o R foot pain.  Unable to elaborate further.  She specifically denies nausea, vomiting, CP and SOB.  Pt is unable to name any family members or her home address.       Overnight Events: BP remained at goal.  As per monitor, since MN, patient had about 9 episodes of nonsustained sinus tachycardia 120-130 lasting 10 seconds each. 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 Patient HD stable. Asymtomatic. A&Ox1-2. Patient bladder scan showed 630 cc residual. About to straight cath but later incontinence. Repeat bladder scan showing 20cc. Like cause of sinus tachy.    Subjective: Patient seen and examined at bedside.    [OBJECTIVE]:    Vital Signs:  T(F): 98.2, Max: 98.6 ( @ 01:00)  HR: 75 (68 - 93)  BP: 123/63 (113/59 - 159/67)  BP(mean): 86 (86 - 105)  RR: 18 (16 - 18)  SpO2: 96% (95% - 99%)  Wt(kg): --  CVP(cm H2O): --      I & Os for current day (as of  @ 07:10)  =============================================  IN: 0 ml / OUT: 500 ml / NET: -500 ml    CAPILLARY BLOOD GLUCOSE  109 (2017 11:03)    General: NAD, comfortably lying in bed  HEENT: dry MM, no JVD, no thyromegaly, no carotid bruit  Resp: CTAB b/l  Cardiac: RRR, +S1 +S2  Abd: Obese, ND, NTTP  Ext: WWP, 2+ DP and radial pulses b/l  Neuro: More alert this AM, A&O x 2, 2/5 LE strength b/l    Medications:  MEDICATIONS  (STANDING):  atorvastatin 80milliGRAM(s) Oral at bedtime    MEDICATIONS  (PRN):      Allergies:    No Known Allergies    Intolerances    Labs:                        8.7    6.7   )-----------( 267      ( 2017 06:34 )             27.9     2017 06:34    140    |  107    |  49     ----------------------------<  81     3.3     |  23     |  1.60     Ca    8.2        2017 06:34  Phos  3.8       2017 06:34  Mg     2.2       2017 06:34    TPro  8.9    /  Alb  4.0    /  TBili  0.9    /  DBili  x      /  AST  41     /  ALT  41     /  AlkPhos  119    2017 11:05      Urinalysis Basic - ( 2017 19:45 )    Color: Yellow / Appearance: Slightly cloudy / S.025 / pH: x  Gluc: x / Ketone: Negative mg/dL  / Bili: Negative / Urobili: 1.0 E.U./dL   Blood: x / Protein: 30 mg/dL / Nitrite: Negative   Leuk Esterase: Negative / RBC: < 5 /HPF / WBC < 5 /HPF   Sq Epi: x / Non Sq Epi: Few /HPF / Bacteria: Present /HPF        Radiology and other tests: Hospital Course:  52 yo F w/ unknown PMH was BIBEMS to [facility] after being found leaning against a wall at [facility] for 3.5 hrs.  Pt has occasional incomprehensible speech w/ word salad.  CT head which revealed a left thalamic hemorrhage with local edema and mass effect likely to be 2/2 hypertensive angiopathy.  Pt c/o R foot pain.  Unable to elaborate further.  She specifically denies nausea, vomiting, CP and SOB.  Pt is unable to name any family members or her home address.       Overnight Events: BP remained at goal.  As per monitor, since MN, patient had about 9 episodes of nonsustained sinus tachycardia 120-130 lasting 10 seconds each. 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 Patient HD stable. Asymtomatic. A&Ox1-2. Patient bladder scan showed 630 cc residual. About to straight cath but later incontinence. Repeat bladder scan showing 20cc. Like cause of sinus tachy.    Subjective: Patient seen and examined at bedside.  Pt more verbal today, but still not answering questions appropriately.  Denies HA, changes in vision, CP, SOB, abdominal pain, dyspnea,     [OBJECTIVE]:    Vital Signs:  T(F): 98.2, Max: 98.6 ( @ 01:00)  HR: 75 (68 - 93)  BP: 123/63 (113/59 - 159/67)  BP(mean): 86 (86 - 105)  RR: 18 (16 - 18)  SpO2: 96% (95% - 99%)  Wt(kg): --  CVP(cm H2O): --      I & Os for current day (as of  @ 07:10)  =============================================  IN: 0 ml / OUT: 500 ml / NET: -500 ml    CAPILLARY BLOOD GLUCOSE  109 (2017 11:03)    General: NAD, comfortably lying in bed  HEENT: dry MM, no JVD, no thyromegaly, no carotid bruit  Resp: CTAB b/l  Cardiac: RRR, +S1 +S2  Abd: Obese, ND, NTTP  Ext: WWP, 2+ DP and radial pulses b/l  Neuro: More alert this AM, A&O x 2, 2/5 LE strength b/l    Medications:  MEDICATIONS  (STANDING):  atorvastatin 80milliGRAM(s) Oral at bedtime    MEDICATIONS  (PRN):      Allergies:    No Known Allergies    Intolerances    Labs:                        8.7    6.7   )-----------( 267      ( 2017 06:34 )             27.9     2017 06:34    140    |  107    |  49     ----------------------------<  81     3.3     |  23     |  1.60     Ca    8.2        2017 06:34  Phos  3.8       2017 06:34  Mg     2.2       2017 06:34    TPro  8.9    /  Alb  4.0    /  TBili  0.9    /  DBili  x      /  AST  41     /  ALT  41     /  AlkPhos  119    2017 11:05      Urinalysis Basic - ( 2017 19:45 )    Color: Yellow / Appearance: Slightly cloudy / S.025 / pH: x  Gluc: x / Ketone: Negative mg/dL  / Bili: Negative / Urobili: 1.0 E.U./dL   Blood: x / Protein: 30 mg/dL / Nitrite: Negative   Leuk Esterase: Negative / RBC: < 5 /HPF / WBC < 5 /HPF   Sq Epi: x / Non Sq Epi: Few /HPF / Bacteria: Present /HPF        Radiology and other tests:

## 2017-02-03 NOTE — PHYSICAL THERAPY INITIAL EVALUATION ADULT - LEVEL OF INDEPENDENCE: SIT/STAND, REHAB EVAL
maximum assist (25% patients effort)/unable to perform/unable to achieve full erect standing and maintain static standing  secondary pain bilat LEs

## 2017-02-03 NOTE — OCCUPATIONAL THERAPY INITIAL EVALUATION ADULT - COORDINATION ASSESSED, REHAB EVAL
decreased commands following, however no dysmetria observed bilaterally/finger to nose finger to nose/decreased commands following, however no dysmetria observed bilaterally

## 2017-02-03 NOTE — PHYSICAL THERAPY INITIAL EVALUATION ADULT - COORDINATION ASSESSED, REHAB EVAL
mild dysmetria bilat, likely poor following of directions, improves with repeated verbal cues./finger to nose

## 2017-02-03 NOTE — DIETITIAN INITIAL EVALUATION ADULT. - ENERGY NEEDS
Height: 167.64cm Weight: 128kg, IBW 59kg+/-10%, %%, BMI 45.5  IBW used to calculate energy needs due to pt's current body weight exceeding 120% of IBW  Increase protein needs 2/2 to obesity Height: 167.64cm (obtained from pt) Weight: 128kg (measured on bedscale), IBW 59kg+/-10%, %%, BMI 45.5  IBW used to calculate energy needs due to pt's current body weight exceeding 120% of IBW  Increase protein needs 2/2 to obesity

## 2017-02-03 NOTE — OCCUPATIONAL THERAPY INITIAL EVALUATION ADULT - ADDITIONAL COMMENTS
Patient is poor historian, however states she was living with daughters in apartment in [place] prior to admission.

## 2017-02-03 NOTE — OCCUPATIONAL THERAPY INITIAL EVALUATION ADULT - RANGE OF MOTION EXAMINATION, UPPER EXTREMITY
bilateral UE Active ROM was WFL  (within functional limits)/bilateral UE Passive ROM was WFL  (within functional limits)

## 2017-02-04 LAB
ALBUMIN SERPL ELPH-MCNC: 2.8 G/DL — LOW (ref 3.4–5)
ALP SERPL-CCNC: 97 U/L — SIGNIFICANT CHANGE UP (ref 40–120)
ALT FLD-CCNC: 22 U/L — SIGNIFICANT CHANGE UP (ref 12–42)
ANION GAP SERPL CALC-SCNC: 9 MMOL/L — SIGNIFICANT CHANGE UP (ref 9–16)
AST SERPL-CCNC: 25 U/L — SIGNIFICANT CHANGE UP (ref 15–37)
BILIRUB SERPL-MCNC: 0.8 MG/DL — SIGNIFICANT CHANGE UP (ref 0.2–1.2)
BUN SERPL-MCNC: 29 MG/DL — HIGH (ref 7–23)
CALCIUM SERPL-MCNC: 8.3 MG/DL — LOW (ref 8.5–10.5)
CHLORIDE SERPL-SCNC: 109 MMOL/L — HIGH (ref 96–108)
CO2 SERPL-SCNC: 25 MMOL/L — SIGNIFICANT CHANGE UP (ref 22–31)
CREAT SERPL-MCNC: 0.88 MG/DL — SIGNIFICANT CHANGE UP (ref 0.5–1.3)
GLUCOSE SERPL-MCNC: 81 MG/DL — SIGNIFICANT CHANGE UP (ref 70–99)
GRAM STN FLD: SIGNIFICANT CHANGE UP
HCT VFR BLD CALC: 30.2 % — LOW (ref 34.5–45)
HCYS SERPL-MCNC: 29.1 UMOL/L — HIGH (ref 5–15)
HGB BLD-MCNC: 9 G/DL — LOW (ref 11.5–15.5)
INR BLD: 1.19 — HIGH (ref 0.88–1.16)
MAGNESIUM SERPL-MCNC: 2.1 MG/DL — SIGNIFICANT CHANGE UP (ref 1.6–2.4)
MCHC RBC-ENTMCNC: 27.3 PG — SIGNIFICANT CHANGE UP (ref 27–34)
MCHC RBC-ENTMCNC: 29.8 G/DL — LOW (ref 32–36)
MCV RBC AUTO: 91.5 FL — SIGNIFICANT CHANGE UP (ref 80–100)
PLATELET # BLD AUTO: 228 K/UL — SIGNIFICANT CHANGE UP (ref 150–400)
POTASSIUM SERPL-MCNC: 3.5 MMOL/L — SIGNIFICANT CHANGE UP (ref 3.5–5.3)
POTASSIUM SERPL-SCNC: 3.5 MMOL/L — SIGNIFICANT CHANGE UP (ref 3.5–5.3)
PROT SERPL-MCNC: 6.7 G/DL — SIGNIFICANT CHANGE UP (ref 6–8.3)
PROT SERPL-MCNC: 6.7 G/DL — SIGNIFICANT CHANGE UP (ref 6–8.3)
PROT SERPL-MCNC: 6.9 G/DL — SIGNIFICANT CHANGE UP (ref 6.4–8.2)
PROTHROM AB SERPL-ACNC: 13.2 SEC — HIGH (ref 10–13.1)
RBC # BLD: 3.3 M/UL — LOW (ref 3.8–5.2)
RBC # FLD: 22 % — HIGH (ref 10.3–16.9)
SODIUM SERPL-SCNC: 143 MMOL/L — SIGNIFICANT CHANGE UP (ref 135–145)
SPECIMEN SOURCE: SIGNIFICANT CHANGE UP
WBC # BLD: 5.8 K/UL — SIGNIFICANT CHANGE UP (ref 3.8–10.5)
WBC # FLD AUTO: 5.8 K/UL — SIGNIFICANT CHANGE UP (ref 3.8–10.5)

## 2017-02-04 RX ORDER — POTASSIUM CHLORIDE 20 MEQ
40 PACKET (EA) ORAL ONCE
Qty: 0 | Refills: 0 | Status: COMPLETED | OUTPATIENT
Start: 2017-02-04 | End: 2017-02-04

## 2017-02-04 RX ORDER — FOLIC ACID 0.8 MG
1 TABLET ORAL DAILY
Qty: 0 | Refills: 0 | Status: DISCONTINUED | OUTPATIENT
Start: 2017-02-04 | End: 2017-02-10

## 2017-02-04 RX ORDER — THIAMINE MONONITRATE (VIT B1) 100 MG
100 TABLET ORAL DAILY
Qty: 0 | Refills: 0 | Status: DISCONTINUED | OUTPATIENT
Start: 2017-02-04 | End: 2017-02-10

## 2017-02-04 RX ADMIN — Medication 100 MILLIGRAM(S): at 18:48

## 2017-02-04 RX ADMIN — Medication 1 MILLIGRAM(S): at 18:48

## 2017-02-04 RX ADMIN — ATORVASTATIN CALCIUM 80 MILLIGRAM(S): 80 TABLET, FILM COATED ORAL at 21:53

## 2017-02-04 RX ADMIN — Medication 1 TABLET(S): at 18:48

## 2017-02-04 RX ADMIN — Medication 40 MILLIEQUIVALENT(S): at 12:31

## 2017-02-04 NOTE — PROGRESS NOTE ADULT - PROBLEM SELECTOR PLAN 2
- Cr 3.75, unknown baseline  - F/u retroperitoneal US - Cr 3.75, unknown baseline.  Pt and daughter deny kidney disease.    - retroperitoneal US insignificant for hydronephrosis, did show hypersteatosis     - F/u LFTs Resolved - Cr 3.75 --> .88.  Likely 2/2 decreased PO intake  - retroperitoneal US insignificant for hydronephrosis, did show hypersteatosis     - F/u LFTs

## 2017-02-04 NOTE — PROGRESS NOTE ADULT - SUBJECTIVE AND OBJECTIVE BOX
Overnight Events: + blood cultures. No clinical symptoms. Repeated blood cultures     Subjective: Patient seen and examined at bedside.    [OBJECTIVE]:    Vital Signs:  T(F): 97.9, Max: 99 ( @ 22:14)  HR: 60 (60 - 92)  BP: 144/93 (124/74 - 144/93)  BP(mean): 112 (97 - 112)  RR: 18 (18 - 41)  SpO2: 96% (95% - 99%)  Wt(kg): --  CVP(cm H2O): --      I & Os for current day (as of  @ 07:21)  =============================================  IN: 300 ml / OUT: 900 ml / NET: -600 ml    CAPILLARY BLOOD GLUCOSE  109 (2017 11:03)      Physcial Exam:    General: AO x 3, NAD, Comfotable, Pleasant, Anxious, Agitated, Ill, Frail, Cachectic, Resp distress  HEENT: PERRL/ EOMI, no scleral icterus, no ptosis, MMM, JVD, no thyromegaly  Respiratory: CTA b/l, no wheezes, rales or rhonchi  Cardiovascular: Regular, +S1 + S2  Abdomen: Soft, NTND, normoactive bowel sounds, no rebound, no gaurding, no suprapubic tenderness  Extremities: No cyanosis, no clubbing, no edema, pulses equal, no calf tenderness  Skin: No rashes  Lymphatic: No cervical/supraclavicular LAD  Neurological: CN II-XII grossly intact, follows commands, moves all etremities    Medications:  MEDICATIONS  (STANDING):  atorvastatin 80milliGRAM(s) Oral at bedtime    MEDICATIONS  (PRN):      Allergies:    No Known Allergies    Intolerances        Labs:                        9.0    5.8   )-----------( 228      ( 2017 06:37 )             30.2     2017 06:37    143    |  109    |  29     ----------------------------<  81     3.5     |  25     |  0.88     Ca    8.3        2017 06:37  Phos  3.8       2017 06:34  Mg     2.1       2017 06:37    TPro  6.9    /  Alb  2.8    /  TBili  0.8    /  DBili  x      /  AST  25     /  ALT  22     /  AlkPhos  97     2017 06:37    PT/INR - ( 2017 06:37 )   PT: 13.2 sec;   INR: 1.19          PTT - ( 2017 18:49 )  PTT:24.7 sec  Urinalysis Basic - ( 2017 19:45 )    Color: Yellow / Appearance: Slightly cloudy / S.025 / pH: x  Gluc: x / Ketone: Negative mg/dL  / Bili: Negative / Urobili: 1.0 E.U./dL   Blood: x / Protein: 30 mg/dL / Nitrite: Negative   Leuk Esterase: Negative / RBC: < 5 /HPF / WBC < 5 /HPF   Sq Epi: x / Non Sq Epi: Few /HPF / Bacteria: Present /HPF        Radiology and other tests: PGY-1 Transfer Note:    Hospital Course:   52 yo F w/ PMHx of HTN (noncompliant w/ medications), liver dysfunction 2/2 Etoh abuse, DVT previously on coumadin (noncompliant), psychiatric hx was BIBEMS to [facility] after being found leaning against a wall at [place] for 3.5 hrs.  Pt has occasional incomprehensible speech w/ word salad.  CT head which revealed a left thalamic hemorrhage with local edema and mass effect likely to be 2/2 hypertensive angiopathy.  Antiplatelet and anticoagulant were contraindicated due to intracerebral hemorrhage and pt was started on Atorvastatin 80 mg. Initially, she was A&O x 1 that improved to X 3 over the course of 2 days.  However, she still had tangential thinking.  Her ECHO was unremarkable.  She was stable for SD to [facility] for further workup of her underlying etiology of her stroke.     Overnight Events: + blood cultures. No clinical symptoms. Repeated blood cultures     Subjective: Patient seen and examined at bedside. Pt more alert this AM.  Daughter at bedside.  Denies HA, vision changes, nausea, CP and palpitations.      [OBJECTIVE]:    Vital Signs:  T(F): 97.9, Max: 99 ( @ 22:14)  HR: 60 (60 - 92)  BP: 144/93 (124/74 - 144/93)  BP(mean): 112 (97 - 112)  RR: 18 (18 - 41)  SpO2: 96% (95% - 99%)  Wt(kg): --  CVP(cm H2O): --      I & Os for current day (as of  @ 07:21)  =============================================  IN: 300 ml / OUT: 900 ml / NET: -600 ml    CAPILLARY BLOOD GLUCOSE  109 (2017 11:03)    Physical Exam:     General: NAD, comfortably lying in bed  HEENT: dry MM, no JVD, no thyromegaly, no carotid bruit  Resp: CTAB b/l  Cardiac: RRR, +S1 +S2  Abd: Obese, ND, NTTP  Ext: WWP, 2+ DP and radial pulses b/l  Neuro: More alert this AM, A&O x 3, 2/5 LE strength b/l    Medications:  MEDICATIONS  (STANDING):  atorvastatin 80milliGRAM(s) Oral at bedtime    MEDICATIONS  (PRN):      Allergies:    No Known Allergies    Intolerances        Labs:                        9.0    5.8   )-----------( 228      ( 2017 06:37 )             30.2     2017 06:37    143    |  109    |  29     ----------------------------<  81     3.5     |  25     |  0.88     Ca    8.3        2017 06:37  Phos  3.8       2017 06:34  Mg     2.1       2017 06:37    TPro  6.9    /  Alb  2.8    /  TBili  0.8    /  DBili  x      /  AST  25     /  ALT  22     /  AlkPhos  97     2017 06:37    PT/INR - ( 2017 06:37 )   PT: 13.2 sec;   INR: 1.19          PTT - ( 2017 18:49 )  PTT:24.7 sec  Urinalysis Basic - ( 2017 19:45 )    Color: Yellow / Appearance: Slightly cloudy / S.025 / pH: x  Gluc: x / Ketone: Negative mg/dL  / Bili: Negative / Urobili: 1.0 E.U./dL   Blood: x / Protein: 30 mg/dL / Nitrite: Negative   Leuk Esterase: Negative / RBC: < 5 /HPF / WBC < 5 /HPF   Sq Epi: x / Non Sq Epi: Few /HPF / Bacteria: Present /HPF        Radiology and other tests:

## 2017-02-04 NOTE — PROGRESS NOTE ADULT - PROBLEM SELECTOR PLAN 1
CT head significant for left thalamic hemorrhage with local edema and mass effect.  - C/w 80 lipitor qhs  - Neuro checks q 4 h  - Dc MRA head w/o cont - pt could not tolerate  - MRI head w/o cont  - US duplex carotid arteries  - Spoke w/ Dr. Kruger, pt's PCP, however, has not seen her since 2015.  Reports that she has long standing hx of HTN, EtoH abuse w/ abnormal LFT's, DVT previously on coumadin and some psychiatric condition.  She was non-compliant w/ medications.  - SBP goal < 140, currently in 130's CT head significant for left thalamic hemorrhage with local edema and mass effect.  - C/w 80 lipitor qhs  - Neuro checks q 4 h  - Consider Psychiatric consult for underlying mental health disease - pt was previously on Risperidone as per PMD  - Dc MRA head w/o cont - pt could not tolerate  - MRI head w/o cont  - US duplex carotid arteries  - Spoke w/ Dr. Kruger, pt's PCP, however, has not seen her since 2015.  Reports that she has long standing hx of HTN, EtoH abuse w/ abnormal LFT's, DVT previously on coumadin and some psychiatric condition.  She was non-compliant w/ medications.  - SBP goal < 140, currently in 130's

## 2017-02-04 NOTE — PROGRESS NOTE ADULT - PROBLEM SELECTOR PLAN 4
Holding HSQ in setting of left thalamic hemorrhage  Avoid antiplatelets and anticoagulation in setting of ICH  Dispo: Will keep on 7 [facility] for q 4 neurochecks Holding HSQ in setting of left thalamic hemorrhage  Avoid antiplatelets and anticoagulation in setting of ICH  Dispo: Stable for SD for further workup

## 2017-02-05 DIAGNOSIS — F10.10 ALCOHOL ABUSE, UNCOMPLICATED: ICD-10-CM

## 2017-02-05 DIAGNOSIS — D64.9 ANEMIA, UNSPECIFIED: ICD-10-CM

## 2017-02-05 DIAGNOSIS — N17.9 ACUTE KIDNEY FAILURE, UNSPECIFIED: ICD-10-CM

## 2017-02-05 LAB
ANION GAP SERPL CALC-SCNC: 7 MMOL/L — LOW (ref 9–16)
BUN SERPL-MCNC: 19 MG/DL — SIGNIFICANT CHANGE UP (ref 7–23)
CALCIUM SERPL-MCNC: 8 MG/DL — LOW (ref 8.5–10.5)
CHLORIDE SERPL-SCNC: 110 MMOL/L — HIGH (ref 96–108)
CO2 SERPL-SCNC: 26 MMOL/L — SIGNIFICANT CHANGE UP (ref 22–31)
CREAT SERPL-MCNC: 0.85 MG/DL — SIGNIFICANT CHANGE UP (ref 0.5–1.3)
CULTURE RESULTS: SIGNIFICANT CHANGE UP
GLUCOSE SERPL-MCNC: 89 MG/DL — SIGNIFICANT CHANGE UP (ref 70–99)
HCT VFR BLD CALC: 28.7 % — LOW (ref 34.5–45)
HGB BLD-MCNC: 8.9 G/DL — LOW (ref 11.5–15.5)
MCHC RBC-ENTMCNC: 27.7 PG — SIGNIFICANT CHANGE UP (ref 27–34)
MCHC RBC-ENTMCNC: 31 G/DL — LOW (ref 32–36)
MCV RBC AUTO: 89.4 FL — SIGNIFICANT CHANGE UP (ref 80–100)
PLATELET # BLD AUTO: 259 K/UL — SIGNIFICANT CHANGE UP (ref 150–400)
POTASSIUM SERPL-MCNC: 3.7 MMOL/L — SIGNIFICANT CHANGE UP (ref 3.5–5.3)
POTASSIUM SERPL-SCNC: 3.7 MMOL/L — SIGNIFICANT CHANGE UP (ref 3.5–5.3)
RBC # BLD: 3.21 M/UL — LOW (ref 3.8–5.2)
RBC # FLD: 21 % — HIGH (ref 10.3–16.9)
SODIUM SERPL-SCNC: 143 MMOL/L — SIGNIFICANT CHANGE UP (ref 135–145)
SPECIMEN SOURCE: SIGNIFICANT CHANGE UP
WBC # BLD: 6.1 K/UL — SIGNIFICANT CHANGE UP (ref 3.8–10.5)
WBC # FLD AUTO: 6.1 K/UL — SIGNIFICANT CHANGE UP (ref 3.8–10.5)

## 2017-02-05 RX ORDER — POTASSIUM CHLORIDE 20 MEQ
40 PACKET (EA) ORAL ONCE
Qty: 0 | Refills: 0 | Status: COMPLETED | OUTPATIENT
Start: 2017-02-05 | End: 2017-02-05

## 2017-02-05 RX ORDER — POTASSIUM CHLORIDE 20 MEQ
40 PACKET (EA) ORAL ONCE
Qty: 0 | Refills: 0 | Status: DISCONTINUED | OUTPATIENT
Start: 2017-02-05 | End: 2017-02-05

## 2017-02-05 RX ADMIN — ATORVASTATIN CALCIUM 80 MILLIGRAM(S): 80 TABLET, FILM COATED ORAL at 21:09

## 2017-02-05 RX ADMIN — Medication 40 MILLIEQUIVALENT(S): at 07:45

## 2017-02-05 RX ADMIN — Medication 1 MILLIGRAM(S): at 11:05

## 2017-02-05 RX ADMIN — Medication 1 TABLET(S): at 11:05

## 2017-02-05 RX ADMIN — Medication 100 MILLIGRAM(S): at 11:05

## 2017-02-05 NOTE — PROGRESS NOTE ADULT - ASSESSMENT
52yo F w/ unknown PMH other than Etoh abuse, found to have left thalamic hemorrhage with local edema and mass effect on CT head.

## 2017-02-05 NOTE — PROGRESS NOTE ADULT - PROBLEM SELECTOR PLAN 1
CT head significant for left thalamic hemorrhage with local edema and mass effect. MRI head showing L thalamic hematoma with surrounding edema + chronic b/l basal ganglia and pontine infarcts. ESR/CRP elevated. ECHO without PFO. Carotid US w/o hemodynamically significant stenosis.  - C/w Lipitor 80mg po qd. ASA/Plavix contraindicated.   - Neuro checks q4h  - Consider psychiatric consult for underlying mental health disease - pt was previously on Risperidone as per PMD  - SBP goal < 140, currently in 130's  - Hypercoagulative workup sent, f/u results.

## 2017-02-05 NOTE — PROGRESS NOTE ADULT - PROBLEM SELECTOR PLAN 3
Hgb 10.1 noted on admission, now downtrending to 8.9 today (stable), patient denying blood in stool or urine, no other signs of bleeding other than ICH. Likely iron deficiency vs anemia or chronic disease  - f/u AM anemia panel

## 2017-02-05 NOTE — PROGRESS NOTE ADULT - SUBJECTIVE AND OBJECTIVE BOX
PGY-1 TRANSFER NOTE    HOSPITAL COURSE: Pt is a 52yo F w/ PMH of HTN (noncompliant w/ medications), liver dysfunction 2/2 Etoh abuse, DVT previously on coumadin (noncompliant), unknown psychiatric hx BIBEMS to [facility] 3 days ago after being found leaning against a wall at [place] [facility] for 3.5 hrs. CT head performed emergently showed L thalamic hemorrhage with local edema and mass effect likely to be 2/2 hypertensive angiopathy with chronic small vessel lacunar infarcts. Transported to [facility] and admitted to [facility] for continued evaluation. Antiplatelet and anticoagulant therapy were contraindicated due to intracerebral hemorrhage and pt was started on Atorvastatin 80 mg. Initially, she was A&O x 1, which improved to x2 (person and place) over the next 2 days. ECHO performed was unremarkable. Xray hip and foot without signs of fracture. US RP (2/3) not showing any signs of hydronephrosis, but with signs of possible hepatic steatosis. Medically optimized for stepdown to F.    O/N EVENTS: DEANDRE  S: No complaints. Denies HA, vision changes, nausea/vomiting, anxiety, CP/palpitations, new weakness/numbness/tingling.  O: See below for vitals, PE and labs.    Vital Signs- Last 24 Hrs:  T(F): 99.1, Max: 99.1 (02-04 @ 21:15)  HR: 86 (74 - 88)  BP: 141/76 (113/62 - 154/84)  BP(mean): 102 (96 - 113)  RR: 20 (14 - 20)  SpO2: 97% (97% - 98%)    PE: General: NAD, laying in bed comfortably  HEENT: PEERLA, EOMI  Respiratory: CTA b/l, no wheezing/rales/rhonchi  Cardiovascular: S1/S2 normal, RRR, no murmurs  Abdomen: Soft, obese, non tender, non distended, +BS   Extremities: WWP, 2+ DP pulses  Neurological: A&Ox2, CN2-12 grossly intact, 5/5 strength in upper and lower extremities, unable to assess gait    Labs:             8.9    6.1   )-----------( 259               28.7     143    |  110    |  19     ----------------------------<  89     3.7     |  26     |  0.85     Ca     8.0           Mg     2.1         TPro  6.7      MEDICATIONS:  atorvastatin 80milliGRAM(s) Oral at bedtime  multivitamin 1Tablet(s) Oral daily  thiamine 100milliGRAM(s) Oral daily  folic acid 1milliGRAM(s) Oral daily

## 2017-02-05 NOTE — PROGRESS NOTE ADULT - ASSESSMENT
51F with HTN, HLD, EtOH abuse found to have AMS likely 2/2 left thalamic hemorrhage with local edema and mass effect on CT head.

## 2017-02-05 NOTE — PROGRESS NOTE ADULT - PROBLEM SELECTOR PLAN 2
Initally presented with Cr 3.75, now improved to 0.88  - Now known h/o CKD, will continue to monitor

## 2017-02-05 NOTE — PROGRESS NOTE ADULT - PROBLEM SELECTOR PLAN 5
Holding HSQ in setting of left thalamic hemorrhage  Avoid antiplatelets and anticoagulation in setting of ICH  Dispo: Transfer to [facility], outpatient planning as per PT recs

## 2017-02-05 NOTE — PROGRESS NOTE ADULT - PROBLEM SELECTOR PLAN 4
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  Avoid antiplatelets and anticoagulation in setting of ICH.  Dispo: Stable for SD for further workup  FULL CODE

## 2017-02-05 NOTE — PROGRESS NOTE ADULT - SUBJECTIVE AND OBJECTIVE BOX
Stroke Neurology Follow-Up Visit    Pt seen and examined with daughter at bedside.   Fully awake and interactive today.   No complaints at this time. No headache, nausea, vomiting, new weakness, or numbness.   She insists that she takes her meds at home.    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 5/5 x4   Sensation: Intact and symmetric of light touch.  No neglect.   Coordination: No dysmetria on finger-to-nose.     MEDICATIONS  (STANDING):  atorvastatin 80milliGRAM(s) Oral at bedtime  multivitamin 1Tablet(s) Oral daily  thiamine 100milliGRAM(s) Oral daily  folic acid 1milliGRAM(s) Oral daily    Labs:                        8.9    6.1   )-----------( 259      ( 05 Feb 2017 06:25 )             28.7   05 Feb 2017 06:25    143    |  110    |  19     ----------------------------<  89     3.7     |  26     |  0.85     Ca    8.0        05 Feb 2017 06:25  Mg     2.1       04 Feb 2017 06:37    TPro  6.7    /  Alb  x      /  TBili  x      /  DBili  x      /  AST  x      /  ALT  x      /  AlkPhos  x      04 Feb 2017 18:49    LIVER FUNCTIONS - ( 02 Feb 2017 11:05 )  Alb: 4.0 g/dL / Pro: 8.9 g/dL / ALK PHOS: 119 U/L / ALT: 41 U/L / AST: 41 U/L / GGT: x           Cholesterol, Serum: 113 mg/dL  HDL Cholesterol, Serum: 34 mg/dL  Triglycerides, Serum: 60 mg/dL  LDL 67   Hemoglobin A1C, Whole Blood: 5.0 %  Thyroid Stimulating Hormone, Serum: 0.832 uIU/mL    UTox negative     Radiology:  2/2 CT head:   IMPRESSION: Left thalamic hemorrhage with local edema and mass effect. This is felt most likely to be secondary to hypertensive angiopathy given that there are other more chronic sites of small vessel lacunar injury with advanced cerebral white matter disease. Clinical correlation is suggested.  Follow-up to resolution is recommended to exclude alternate etiologies.    2/2 MRI head:   IMPRESSION:  1. Left thalamic hematoma with surrounding parenchymal edema and expansion of the left thalamus as further described above.  2. Microangiopathic ischemic disease. Chronic bilateral basal ganglia and pontine infarcts.  3. No hydrocephalus or acute vascular territorial infarct.    2/3 - Carotid dopplers:  No hemodynamically significant stenosis.    2/3 - Echocardiogram with bubble:  Mild concentric left ventricular hypertrophy. The left ventricular wall motion is normal. The left ventricular ejection fraction = 60-65%. The left atrial size is normal. Injection of agitated saline contrast documented no interatrial shunt. Right atrial size is normal. The right ventricle is normal in size and function. No evidence for any hemodynamically significant valvular disease.    Assessment & Plan:  51 year old female with unknown PMH presented to [facility] after being found leaning against a wall at [facility] for 3.5 hours with slurred speech. Found to have left thalamic hemorrhage with edema and mass effect.     1) a. Stroke work up - completed  -etiology of hemorrhagic stroke likely secondary to small vessel disease/hypertensive angiopathy   -MRI head with left thalamic hemorrhage with surrounding edema, also with chronic b/l basal ganglia and pontine infarcts and microangiopathic ischemic disease-suggestive of long standing vascular risk factors  -She couldn't tolerate further MR so MRA Head not performed.  Given size of stroke, doubt that large vessel involvement  -Echocardiogram documents effects of hypertension    -passed bedside dysphagia screen-tolerating PO diet   - Speech therapy to evaluate if aphasic versus cognitive effects on her speech output    b. Secondary stroke prevention  -antiplatelet and anticoagulant contraindicated due to intracerebral hemorrhage.  She will need repeat CT Head in 4 weeks to document resolution of her hemorrhage with decision subsequently of meds.  -continue on atorvastatin 80 mg-etiology of stroke likely secondary to small vessel disease so would benefit from high dose statin    c. Stroke risk factors -   -HTN - Better controlled on Lisinopril.  goal is less than 140/80  -Unclear history of DVT in past    2) DVT prophylaxis  -holding HSQ in the setting of bleed  -intermittent pneumatic compression devices on her legs    3) Dispo - PT recommends rehab facility.  Repeat evaluation on Monday.    4) Collateral information from her daughter -   -She hasn't been compliant with her medications in the past.    -She's been drinking alcohol again - maybe 1-2 bottles per day  -She gets lost if her daughters don't watch her.  She was missing for 8 days this time.  -She was evicted from her apartment in December and has been staying with a friend.     Therefore, encephalopathy can be multi-factorial - thalamic hemorrhage versus Wernicke's versus hepatic encephalopathy.  She would probably benefit from neuro-psychology evaluation as outpatient.

## 2017-02-05 NOTE — PROGRESS NOTE ADULT - SUBJECTIVE AND OBJECTIVE BOX
Transfer Acceptance Note    HOSPITAL COURSE:    51F w HTN (noncompliant w/ medications), liver dysfunction 2/2 Etoh abuse, DVT previously on coumadin (noncompliant), psychiatric hx was BIBEMS to [facility] after being found leaning against a wall at [facility] for 3.5 hrs.  Pt has occasional incomprehensible speech w/ word salad.  CT head which revealed a left thalamic hemorrhage with local edema and mass effect likely to be 2/2 hypertensive angiopathy.  Antiplatelet and anticoagulant were contraindicated due to intracerebral hemorrhage and pt was started on Atorvastatin 80 mg. Initially, she was A&O x 1 that improved to X 3 over the course of 2 days.  However, she still had tangential thinking.  Her ECHO was unremarkable.  She was stable for SD to [facility] for further workup of her underlying etiology of her stroke.     Overnight Events: + blood cultures. No clinical symptoms. Repeated blood cultures     Subjective: Patient seen and examined at bedside. Pt more alert this AM.  Daughter at bedside.  Denies HA, vision changes, nausea, CP and palpitations.        INTERVAL HPI/OVERNIGHT EVENTS:    VITAL SIGNS:  T(F): 98.2  HR: 79  BP: 141/93  RR: 16  SpO2: 98%  Wt(kg): --    PHYSICAL EXAM:    Constitutional:  Eyes:  ENMT:  Neck:  Respiratory:  Cardiovascular:  Gastrointestinal:  Extremities:  Vascular:  Neurological:  Musculoskeletal:    MEDICATIONS  (STANDING):  atorvastatin 80milliGRAM(s) Oral at bedtime  multivitamin 1Tablet(s) Oral daily  thiamine 100milliGRAM(s) Oral daily  folic acid 1milliGRAM(s) Oral daily    MEDICATIONS  (PRN):      Allergies    No Known Allergies    Intolerances        LABS:                        8.9    6.1   )-----------( 259      ( 05 Feb 2017 06:25 )             28.7     05 Feb 2017 06:25    143    |  110    |  19     ----------------------------<  89     3.7     |  26     |  0.85     Ca    8.0        05 Feb 2017 06:25  Mg     2.1       04 Feb 2017 06:37    TPro  6.7    /  Alb  x      /  TBili  x      /  DBili  x      /  AST  x      /  ALT  x      /  AlkPhos  x      04 Feb 2017 18:49    PT/INR - ( 04 Feb 2017 06:37 )   PT: 13.2 sec;   INR: 1.19          PTT - ( 03 Feb 2017 18:49 )  PTT:24.7 sec      RADIOLOGY & ADDITIONAL TESTS: Transfer Acceptance Note    HOSPITAL COURSE:    51F w HTN (noncompliant w/ medications), liver dysfunction 2/2 Etoh abuse, DVT previously on coumadin (noncompliant), unknown psychiatric hx BIBEMS to [facility] 3 days ago after being found leaning against a wall at [facility] for 3.5 hrs. CT head performed emergently showed L thalamic hemorrhage with local edema and mass effect likely to be 2/2 hypertensive angiopathy with chronic small vessel lacunar infarcts. Transported to [facility] and admitted to [facility] for continued evaluation. Antiplatelet and anticoagulant therapy were contraindicated due to intracerebral hemorrhage and pt was started on Atorvastatin 80 mg. Initially, she was A&O x 1 that improved to x2 (person and place) over the next 2 days. ECHO performed was unremarkable. Xray hip and foot without signs of fracture. US RP (2/3) not showing any signs of hydronephrosis, but with signs of possible hepatic steatosis. She was stable for SD to [facility] for further workup of her underlying etiology of her stroke, BP control and placement.    Pt has occasional incomprehensible speech w/ word salad.       Subjective: Patient seen and examined at bedside.  Daughter at bedside.  Denies HA, vision changes, nausea, CP and palpitations.  Denies drinking more than 0.5 pint of vodka daily, however still unable to give history or reason for hospitalization      VITAL SIGNS:  T(F): 98.2  HR: 79  BP: 141/93  RR: 16  SpO2: 98%  Wt(kg): --    PHYSICAL EXAM:    Constitutional: NAD, laying in bed comfortably  Eyes: PEERLA, EOMI  Neck:  Respiratory: CTAB, no wheezing/rales/rhonchi  Cardiovascular: S1/S2 normal, RRR  Abdomen: Soft, obese, non tender, non distended, +BS   Extremities: WWP, 2+ DP, 2+ radial pulses, 5/5 strength in upper and lower extremities   Neurological: CN2-12 grossly intact, 5/5 strength in upper and lower extremities, unable to assess gait    MEDICATIONS  (STANDING):  atorvastatin 80milliGRAM(s) Oral at bedtime  multivitamin 1Tablet(s) Oral daily  thiamine 100milliGRAM(s) Oral daily  folic acid 1milliGRAM(s) Oral daily    No Known Allergies      LABS:                        8.9    6.1   )-----------( 259      ( 05 Feb 2017 06:25 )             28.7     05 Feb 2017 06:25    143    |  110    |  19     ----------------------------<  89     3.7     |  26     |  0.85     Ca    8.0        05 Feb 2017 06:25  Mg     2.1       04 Feb 2017 06:37    TPro  6.7    /  Alb  x      /  TBili  x      /  DBili  x      /  AST  x      /  ALT  x      /  AlkPhos  x      04 Feb 2017 18:49    PT/INR - ( 04 Feb 2017 06:37 )   PT: 13.2 sec;   INR: 1.19          PTT - ( 03 Feb 2017 18:49 )  PTT:24.7 sec      RADIOLOGY & ADDITIONAL TESTS:

## 2017-02-05 NOTE — PROGRESS NOTE ADULT - PROBLEM SELECTOR PLAN 1
CT head significant for left thalamic hemorrhage with local edema and mass effect.  - Continue Lipitor 80mg PO qHS  - Follow up PT for evaluation home vs [facility]  - SBP goal <140  - Hold Anticoagulation 2/2 ICH  - Consider Psychiatric consult for underlying mental health disease - pt was previously on Risperidone as per PMD  - Dc MRA head w/o cont - pt could not tolerate

## 2017-02-06 LAB
ANION GAP SERPL CALC-SCNC: 7 MMOL/L — LOW (ref 9–16)
BUN SERPL-MCNC: 14 MG/DL — SIGNIFICANT CHANGE UP (ref 7–23)
CALCIUM SERPL-MCNC: 8.5 MG/DL — SIGNIFICANT CHANGE UP (ref 8.5–10.5)
CHLORIDE SERPL-SCNC: 109 MMOL/L — HIGH (ref 96–108)
CO2 SERPL-SCNC: 26 MMOL/L — SIGNIFICANT CHANGE UP (ref 22–31)
CREAT SERPL-MCNC: 0.79 MG/DL — SIGNIFICANT CHANGE UP (ref 0.5–1.3)
FACT VIII ACT/NOR PPP: 234 % — HIGH (ref 51–138)
FERRITIN SERPL-MCNC: 12.5 NG/ML — SIGNIFICANT CHANGE UP (ref 8–252)
FOLATE SERPL-MCNC: <2 NG/ML — LOW (ref 4.8–24.2)
GLUCOSE SERPL-MCNC: 91 MG/DL — SIGNIFICANT CHANGE UP (ref 70–99)
HCT VFR BLD CALC: 29.1 % — LOW (ref 34.5–45)
HGB BLD-MCNC: 8.9 G/DL — LOW (ref 11.5–15.5)
IRON SATN MFR SERPL: 29 UG/DL — LOW (ref 50–170)
IRON SATN MFR SERPL: 9 % — LOW (ref 20–38)
MAGNESIUM SERPL-MCNC: 1.7 MG/DL — SIGNIFICANT CHANGE UP (ref 1.6–2.4)
MCHC RBC-ENTMCNC: 27.5 PG — SIGNIFICANT CHANGE UP (ref 27–34)
MCHC RBC-ENTMCNC: 30.6 G/DL — LOW (ref 32–36)
MCV RBC AUTO: 89.8 FL — SIGNIFICANT CHANGE UP (ref 80–100)
PLATELET # BLD AUTO: 255 K/UL — SIGNIFICANT CHANGE UP (ref 150–400)
POTASSIUM SERPL-MCNC: 3.9 MMOL/L — SIGNIFICANT CHANGE UP (ref 3.5–5.3)
POTASSIUM SERPL-SCNC: 3.9 MMOL/L — SIGNIFICANT CHANGE UP (ref 3.5–5.3)
RBC # BLD: 3.24 M/UL — LOW (ref 3.8–5.2)
RBC # FLD: 21 % — HIGH (ref 10.3–16.9)
SODIUM SERPL-SCNC: 142 MMOL/L — SIGNIFICANT CHANGE UP (ref 135–145)
TIBC SERPL-MCNC: 341 UG/DL — SIGNIFICANT CHANGE UP (ref 250–450)
TRANSFERRIN SERPL-MCNC: 270 MG/DL — SIGNIFICANT CHANGE UP (ref 200–360)
VIT B12 SERPL-MCNC: 373 PG/ML — SIGNIFICANT CHANGE UP (ref 243–894)
WBC # BLD: 5.3 K/UL — SIGNIFICANT CHANGE UP (ref 3.8–10.5)
WBC # FLD AUTO: 5.3 K/UL — SIGNIFICANT CHANGE UP (ref 3.8–10.5)

## 2017-02-06 RX ORDER — FLUOXETINE HCL 10 MG
20 CAPSULE ORAL DAILY
Qty: 0 | Refills: 0 | Status: DISCONTINUED | OUTPATIENT
Start: 2017-02-06 | End: 2017-02-10

## 2017-02-06 RX ADMIN — Medication 20 MILLIGRAM(S): at 12:38

## 2017-02-06 RX ADMIN — Medication 1 MILLIGRAM(S): at 12:38

## 2017-02-06 RX ADMIN — Medication 100 MILLIGRAM(S): at 12:38

## 2017-02-06 RX ADMIN — Medication 1 TABLET(S): at 12:38

## 2017-02-06 NOTE — PROGRESS NOTE ADULT - PROBLEM SELECTOR PLAN 1
CT head significant for left thalamic hemorrhage with local edema and mass effect. MRI head showing L thalamic hematoma with surrounding edema + chronic b/l basal ganglia and pontine infarcts. ESR/CRP elevated. ECHO without PFO. Carotid US w/o hemodynamically significant stenosis.  - C/w Lipitor 80mg po qd. ASA/Plavix contraindicated given ICH  - Consider psychiatric consult for underlying mental health disease - pt was previously on Risperidone as per PMD; Per Neuro, patient would likely benefit from neuro-psychology referral outpatient as mental status may be multifactorial.   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

## 2017-02-06 NOTE — PROGRESS NOTE ADULT - SUBJECTIVE AND OBJECTIVE BOX
INTERVAL HPI/OVERNIGHT EVENTS:  Patient was seen and examined at bedside. DEANDRE overnight. Patient seen sitting upright, eating dinner. No complaints at this time.    VITAL SIGNS:  T(F): 98.4  HR: 81  BP: 114/76  RR: 16  SpO2: 99%  Wt(kg): --    PHYSICAL EXAM:    Constitutional: WDWN, NAD  Eyes: PERRL, EOMI, sclera non-icteric  Neck: supple, trachea midline, no masses, no JVD  Respiratory: CTAB, no wheezes, rales, rhonchi  Cardiovascular: RRR, normal S1S2, no M/R/G  Gastrointestinal: soft, NT, obese abdomen, +bowel sounds   Extremities: Warm, well perfused   Neurological: AOx2 (knows name and that she is in the hospital; does not know year); CNII-XII grossly intact.   Skin: Normal temperature, warm, dry    MEDICATIONS  (STANDING):  multivitamin 1Tablet(s) Oral daily  thiamine 100milliGRAM(s) Oral daily  folic acid 1milliGRAM(s) Oral daily  FLUoxetine 20milliGRAM(s) Oral daily    MEDICATIONS  (PRN):      Allergies    No Known Allergies    Intolerances        LABS:                        8.9    5.3   )-----------( 255      ( 06 Feb 2017 06:08 )             29.1     06 Feb 2017 06:05    142    |  109    |  14     ----------------------------<  91     3.9     |  26     |  0.79     Ca    8.5        06 Feb 2017 06:05  Mg     1.7       06 Feb 2017 06:05    TPro  6.7    /  Alb  x      /  TBili  x      /  DBili  x      /  AST  x      /  ALT  x      /  AlkPhos  x      04 Feb 2017 18:49          RADIOLOGY & ADDITIONAL TESTS:

## 2017-02-07 LAB
% ALBUMIN: 48.2 % — SIGNIFICANT CHANGE UP
% ALPHA 1: 5.6 % — SIGNIFICANT CHANGE UP
% ALPHA 2: 11.6 % — SIGNIFICANT CHANGE UP
% BETA: 14.4 % — SIGNIFICANT CHANGE UP
% GAMMA: 20.2 % — SIGNIFICANT CHANGE UP
ALBUMIN SERPL ELPH-MCNC: 3.2 G/DL — LOW (ref 3.6–5.5)
ALBUMIN/GLOB SERPL ELPH: 0.9 RATIO — SIGNIFICANT CHANGE UP
ALPHA1 GLOB SERPL ELPH-MCNC: 0.4 G/DL — SIGNIFICANT CHANGE UP (ref 0.1–0.4)
ALPHA2 GLOB SERPL ELPH-MCNC: 0.8 G/DL — SIGNIFICANT CHANGE UP (ref 0.5–1)
ANA PAT FLD IF-IMP: (no result)
ANA TITR SER: (no result)
ANION GAP SERPL CALC-SCNC: 6 MMOL/L — LOW (ref 9–16)
APCR PPP: 2.54 RATIO — SIGNIFICANT CHANGE UP
AT III ACT/NOR PPP CHRO: 96 % — SIGNIFICANT CHANGE UP (ref 85–135)
B-GLOBULIN SERPL ELPH-MCNC: 1 G/DL — SIGNIFICANT CHANGE UP (ref 0.5–1)
B2 GLYCOPROT1 AB SER QL: NEGATIVE — SIGNIFICANT CHANGE UP
BUN SERPL-MCNC: 12 MG/DL — SIGNIFICANT CHANGE UP (ref 7–23)
CALCIUM SERPL-MCNC: 8.8 MG/DL — SIGNIFICANT CHANGE UP (ref 8.5–10.5)
CARDIOLIPIN AB SER-ACNC: NEGATIVE — SIGNIFICANT CHANGE UP
CHLORIDE SERPL-SCNC: 107 MMOL/L — SIGNIFICANT CHANGE UP (ref 96–108)
CO2 SERPL-SCNC: 27 MMOL/L — SIGNIFICANT CHANGE UP (ref 22–31)
CREAT SERPL-MCNC: 0.78 MG/DL — SIGNIFICANT CHANGE UP (ref 0.5–1.3)
GAMMA GLOBULIN: 1.4 G/DL — SIGNIFICANT CHANGE UP (ref 0.6–1.6)
GLUCOSE SERPL-MCNC: 90 MG/DL — SIGNIFICANT CHANGE UP (ref 70–99)
HCT VFR BLD CALC: 29.5 % — LOW (ref 34.5–45)
HGB BLD-MCNC: 9 G/DL — LOW (ref 11.5–15.5)
INTERPRETATION SERPL IFE-IMP: SIGNIFICANT CHANGE UP
MAGNESIUM SERPL-MCNC: 1.8 MG/DL — SIGNIFICANT CHANGE UP (ref 1.6–2.4)
MCHC RBC-ENTMCNC: 27.4 PG — SIGNIFICANT CHANGE UP (ref 27–34)
MCHC RBC-ENTMCNC: 30.5 G/DL — LOW (ref 32–36)
MCV RBC AUTO: 89.9 FL — SIGNIFICANT CHANGE UP (ref 80–100)
PHOSPHATE SERPL-MCNC: 2.8 MG/DL — SIGNIFICANT CHANGE UP (ref 2.5–4.5)
PLATELET # BLD AUTO: 254 K/UL — SIGNIFICANT CHANGE UP (ref 150–400)
POTASSIUM SERPL-MCNC: 4.3 MMOL/L — SIGNIFICANT CHANGE UP (ref 3.5–5.3)
POTASSIUM SERPL-SCNC: 4.3 MMOL/L — SIGNIFICANT CHANGE UP (ref 3.5–5.3)
PROT C ACT/NOR PPP: 61 % — LOW (ref 74–150)
PROT PATTERN SERPL ELPH-IMP: SIGNIFICANT CHANGE UP
PROT S FREE AG PPP IA-ACNC: 91 % — SIGNIFICANT CHANGE UP (ref 61–131)
RBC # BLD: 3.28 M/UL — LOW (ref 3.8–5.2)
RBC # FLD: 21 % — HIGH (ref 10.3–16.9)
SODIUM SERPL-SCNC: 140 MMOL/L — SIGNIFICANT CHANGE UP (ref 135–145)
WBC # BLD: 5.4 K/UL — SIGNIFICANT CHANGE UP (ref 3.8–10.5)
WBC # FLD AUTO: 5.4 K/UL — SIGNIFICANT CHANGE UP (ref 3.8–10.5)

## 2017-02-07 PROCEDURE — 99232 SBSQ HOSP IP/OBS MODERATE 35: CPT

## 2017-02-07 PROCEDURE — 99223 1ST HOSP IP/OBS HIGH 75: CPT

## 2017-02-07 PROCEDURE — 96118: CPT

## 2017-02-07 PROCEDURE — 96116 NUBHVL XM PHYS/QHP 1ST HR: CPT

## 2017-02-07 RX ORDER — NICOTINE POLACRILEX 2 MG
2 GUM BUCCAL
Qty: 0 | Refills: 0 | Status: DISCONTINUED | OUTPATIENT
Start: 2017-02-07 | End: 2017-02-10

## 2017-02-07 RX ADMIN — Medication 20 MILLIGRAM(S): at 14:46

## 2017-02-07 RX ADMIN — Medication 1 TABLET(S): at 14:45

## 2017-02-07 RX ADMIN — Medication 100 MILLIGRAM(S): at 14:46

## 2017-02-07 RX ADMIN — Medication 1 MILLIGRAM(S): at 14:46

## 2017-02-07 NOTE — PROGRESS NOTE ADULT - SUBJECTIVE AND OBJECTIVE BOX
INTERVAL HPI/OVERNIGHT EVENTS:  Patient was seen and examined at bedside. DEANDRE overnight. Patient seen sitting upright in chair eating breakfast; comfortable. No issues at the moment. Wants to leave.     VITAL SIGNS:  T(F): 98.5  HR: 72  BP: 134/82  RR: 18  SpO2: 99%  Wt(kg): --    PHYSICAL EXAM:    Constitutional: WDWN, NAD  Eyes: PERRL, EOMI, sclera non-icteric  Neck: supple, trachea midline, no masses, no JVD  Respiratory: CTAB, no wheezes, rales, rhonchi  Cardiovascular: RRR, normal S1S2, no M/R/G  Gastrointestinal: soft, NT, obese abdomen, +bowel sounds   Extremities: Warm, well perfused   Neurological: AOx1-2 (Knows here name, but thinks she is in nursing home and that it is 1917); CNII-XII grossly intact.   Skin: Normal temperature, warm, dry    MEDICATIONS  (STANDING):  multivitamin 1Tablet(s) Oral daily  thiamine 100milliGRAM(s) Oral daily  folic acid 1milliGRAM(s) Oral daily  FLUoxetine 20milliGRAM(s) Oral daily    MEDICATIONS  (PRN):      Allergies    No Known Allergies    Intolerances        LABS:                        9.0    5.4   )-----------( 254      ( 07 Feb 2017 08:04 )             29.5     07 Feb 2017 08:04    140    |  107    |  12     ----------------------------<  90     4.3     |  27     |  0.78     Ca    8.8        07 Feb 2017 08:04  Phos  2.8       07 Feb 2017 08:04  Mg     1.8       07 Feb 2017 08:04            RADIOLOGY & ADDITIONAL TESTS:

## 2017-02-07 NOTE — PROGRESS NOTE ADULT - PROBLEM SELECTOR PLAN 1
CT head significant for left thalamic hemorrhage with local edema and mass effect. MRI head showing L thalamic hematoma with surrounding edema + chronic b/l basal ganglia and pontine infarcts. ESR/CRP elevated. ECHO without PFO. Carotid US w/o hemodynamically significant stenosis.  - C/w Lipitor 80mg po qd. ASA/Plavix contraindicated given ICH  - Consider psychiatric consult for underlying mental health disease - pt was previously on Risperidone as per PMD; Per Dr. Novak, patient seen by neurospychologist who recommended psychiatry consult as patient with cognitive impairment, PICA like symptoms (eating erasers and other strange objects), and depression.   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

## 2017-02-07 NOTE — CONSULT NOTE ADULT - SUBJECTIVE AND OBJECTIVE BOX
HPI from admit:    "History obtained from charts as pt has limited communication ability.     52 yo F w/ unknown PMH was BIBEMS to [facility] after being found leaning against a wall at [facility] for 3.5 hrs.  Pt has occasional incomprehensible speech w/ word salad.  CT head which revealed a left thalamic hemorrhage with local edema and mass effect likely to be 2/2 hypertensive angiopathy.  Pt c/o R foot pain.  Unable to elaborate further.  She specifically denies nausea, vomiting, CP and SOB.  Pt is unable to name any family members or her home address." (02 Feb 2017 16:22)    Pt seen earlier today by psychology intern.      Psych HPI:    Past Psych Hx:     PAST MEDICAL & SURGICAL HISTORY:      Allergies    No Known Allergies    Intolerances      MEDICATIONS  (STANDING):  multivitamin 1Tablet(s) Oral daily  thiamine 100milliGRAM(s) Oral daily  folic acid 1milliGRAM(s) Oral daily  FLUoxetine 20milliGRAM(s) Oral daily    MEDICATIONS  (PRN):      Social Hx:  Substance Abuse Hx:    Family Hx:    ROS: Psych: See HPI.  All other systems negative.                          9.0    5.4   )-----------( 254      ( 07 Feb 2017 08:04 )             29.5   07 Feb 2017 08:04    140    |  107    |  12     ----------------------------<  90     4.3     |  27     |  0.78     Ca    8.8        07 Feb 2017 08:04  Phos  2.8       07 Feb 2017 08:04  Mg     1.8       07 Feb 2017 08:04      TSH:     Utox:  Imaging:  Other Tests:    Old Records reviewed:    Collateral:    EXAM:  Vital Signs Last 24 Hrs  T(C): 36.9, Max: 37.4 (02-06 @ 21:04)  T(F): 98.5, Max: 99.4 (02-06 @ 21:04)  HR: 72 (72 - 89)  BP: 134/82 (134/82 - 148/93)  BP(mean): --  RR: 18 (16 - 18)  SpO2: 99% (98% - 99%)  Gen Appearance:  Gait/Station/Muscle Tone:  Abnl Movements:  Speech:  TP;  Associations:  TC:  Mood:  Affect:  Consciousness/orientation:  Memory:   Recent:    Remote:  Attention/Concentration:  Language:  Fund of Knowledge:  Insight:  Judgment:    Suicide Risk Assessment: HPI from admit:    "History obtained from charts as pt has limited communication ability.     52 yo F w/ unknown PMH was BIBEMS to [facility] after being found leaning against a wall at [facility] for 3.5 hrs.  Pt has occasional incomprehensible speech w/ word salad.  CT head which revealed a left thalamic hemorrhage with local edema and mass effect likely to be 2/2 hypertensive angiopathy.  Pt c/o R foot pain.  Unable to elaborate further.  She specifically denies nausea, vomiting, CP and SOB.  Pt is unable to name any family members or her home address." (02 Feb 2017 16:22)    Pt seen earlier today by psychology intern, and again this afternoon, in the presence of her daughter, Mini ([phone]) and her 9-year-old grandson, Vicente.      Psych HPI: Pt is poor historian due to limited communication ability and poor memory. Pt reported having been missing from home for "a few day," which happens from "time to time." The pt reported that when she is missing and "money is moving," she will drink a fifth (750 ml) of vodka, "hang out," sleep at friends' houses. She could not elaborate on where else she will go or what she will do with this time. She denied using other drugs during these times. She claimed not to remember these periods of time or what happened. Pt denied feeling suicidal, though endorsed feeling "down" sometimes."    Past Psych Hx: Per daughter, pt seen by consult psychiatrist during a medical admission at [facility] in 2015, was rx'ed with fluoxetine, but didn't follow up. No hx of inpt psychiatric admissions, suicidal ideation/plan/attempts. Pt reports she has endorsed low mood with her PMD whom she sees for cardiology issues.  Per daughter, pt with long history of ingesting foreign objects including sponges and erasers. Pt cannot state why she does this.    PAST MEDICAL & SURGICAL HISTORY:  As above.      Allergies    No Known Allergies      MEDICATIONS  (STANDING):  multivitamin 1Tablet(s) Oral daily  thiamine 100milliGRAM(s) Oral daily  folic acid 1milliGRAM(s) Oral daily  Fluoxetine 20milliGRAM(s) Oral daily    MEDICATIONS  (PRN):      Social Hx:Pt reported having three daughters with whom she is in touch. All three work in [place] area. Pt could not specify where she lives; she suggested that she lost an apartment in December, that her daughter lives in a shelter and that the pt joins her there. Per daughters, pt recently started drinking alcohol again, is non-compliant with her medications, was evicted from her apartment and gets lost several times a day if daughters don't watch her.  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 She stopped working the in 1990's. Since pt is a poor historian, these timelines were unclear.    Substance Abuse Hx: Pt reports drinking 750 ml vodka when she has money. Denies drug use. Smokes ~1/2 ppd    Family Hx: Denies    ROS: Psych: See HPI.  All other systems negative.                          9.0    5.4   )-----------( 254      ( 07 Feb 2017 08:04 )             29.5   07 Feb 2017 08:04    140    |  107    |  12     ----------------------------<  90     4.3     |  27     |  0.78     Ca    8.8        07 Feb 2017 08:04  Phos  2.8       07 Feb 2017 08:04  Mg     1.8       07 Feb 2017 08:04      TSH:     Utox:  Imaging:  Other Tests:    Old Records reviewed:    Collateral:    EXAM:  Vital Signs Last 24 Hrs  T(C): 36.9, Max: 37.4 (02-06 @ 21:04)  T(F): 98.5, Max: 99.4 (02-06 @ 21:04)  HR: 72 (72 - 89)  BP: 134/82 (134/82 - 148/93)  BP(mean): --  RR: 18 (16 - 18)  SpO2: 99% (98% - 99%)    MSE:  Gen Appearance: Overweight, in hospital gown. Periods of eye contact and periods of staring off. Pt paused for long periods of time before responding to questions. Pt was at times very connected to the conversation and at other times very disconnected, delayed, looking elsewhere.  Gait/Station/Muscle Tone: In bed.  Abnl Movements: None  Speech: +latency. Once started, spurts of normal rhythm with intermittend pauses.  TP; Loose. Difficulty responding to questions  Associations: Tangential. Syntax intact.  TC: Denies SI/HI, AH/VH/PI. May be responding to internal stimuli  Mood: "Great."  Affect: Flat, not congruent with mood, stable.  Consciousness/orientation: A+ O x 4  Memory:   Recent:  3/3  Remote: 0/3  Attention/Concentration: Appeared to focus on conversation but unable to respond to most questions directly.  Language: Partially impaired--difficulty finishing sentences.  Fund of Knowledge: Not assessed.  Insight: Poor  Judgment: Poor    Suicide Risk Assessment: Denied suicidal ideation/plan/intent. No hx.

## 2017-02-07 NOTE — PROGRESS NOTE ADULT - SUBJECTIVE AND OBJECTIVE BOX
Stroke Neurology Follow-Up Visit    Interval History:    No acute issues overnight.    Pt has no complaints.  Seen by neuropsychology in evaluation  yesterday.    Exam:  T(F): 98.5, Max: 99.4 (02-06 @ 21:04)  HR: 72 (72 - 89)  BP: 134/82 (134/82 - 148/93)  RR: 18 (16 - 18)  SpO2: 99% (98% - 99%)  Wt(kg): --    Gen: No acute distress, well-nourished  Neuro:  Mental status: Awake, alert and oriented x 2.  Unsure why she's here.  Denies stroke. Recall 1/3.  Tangential.  Naming and repetition intact.  Difficulty with complex commands.  Follows all simple commands.      Cranial nerves: Pupils equally round and reactive to light, visual fields full, no nystagmus, extraocular muscles intact, V1 through V3 intact bilaterally and symmetric, face symmetric, hearing intact to finger rub, palate elevation symmetric, tongue was midline, sternocleidomastoid/shoulder shrug strength bilaterally 5/5.    Motor:  Normal bulk and tone, right arm drift 4+/5, left arm 5/5, right leg 4+/5, left leg 5/5.  strength 5/5.  Rapid alternating movements slightly slower on right.   Sensation: Light touch intact bilaterally.  No neglect.   Coordination: No dysmetria on finger-to-nose and heel-to-shin.  No clumsiness.  Gait: Stands up without assistance, able to take a few steps, wide based, slightly unsteady, no ataxia    MEDICATIONS  (STANDING):  multivitamin 1Tablet(s) Oral daily  thiamine 100milliGRAM(s) Oral daily  folic acid 1milliGRAM(s) Oral daily  FLUoxetine 20milliGRAM(s) Oral daily    Labs:                        9.0    5.4   )-----------( 254      ( 07 Feb 2017 08:04 )             29.5     07 Feb 2017 08:04    140    |  107    |  12     ----------------------------<  90     4.3     |  27     |  0.78     Ca    8.8        07 Feb 2017 08:04  Phos  2.8       07 Feb 2017 08:04  Mg     1.8       07 Feb 2017 08:04    Cholesterol, Serum: 113 mg/dL  HDL Cholesterol, Serum: 34 mg/dL  Triglycerides, Serum: 60 mg/dL  LDL 67   Hemoglobin A1C, Whole Blood: 5.0 %  Thyroid Stimulating Hormone, Serum: 0.832 uIU/mL  B12 373    UTox negative     Radiology:  2/2 CT head:   IMPRESSION: Left thalamic hemorrhage with local edema and mass effect. This is felt most likely to be secondary to hypertensive angiopathy given that there are other more chronic sites of small vessel lacunar injury with advanced cerebral white matter disease. Clinical correlation is suggested.  Follow-up to resolution is recommended to exclude alternate etiologies.    2/2 MRI head:   IMPRESSION:  1. Left thalamic hematoma with surrounding parenchymal edema and expansion of the left thalamus as further described above.  2. Microangiopathic ischemic disease. Chronic bilateral basal ganglia and pontine infarcts.  3. No hydrocephalus or acute vascular territorial infarct.    2/3 - Carotid dopplers:  No hemodynamically significant stenosis.    2/3 - Echocardiogram with bubble:  Mild concentric left ventricular hypertrophy. The left ventricular wall motion is normal. The left ventricular ejection fraction = 60-65%. The left atrial size is normal. Injection of agitated saline contrast documented no interatrial shunt. Right atrial size is normal. The right ventricle is normal in size and function. No evidence for any hemodynamically significant valvular disease.    Assessment & Plan:  51 year-old right handed woman with unknown medical history other than etoh abuse, depression, pica, and medication non-compliance presents on 2/2 with confusion and was found to have a left thalamic intracerebral hemorrhagic likely due to uncontrolled hypertension and multiple vascular risk factors.      -currently BP well-controlled.  If persistently greater than 130/80, start low dose calcium channel blocker  -no antiplatelet/anticoagulation/statin at this time given hemorrhage.  repeat ct head in four weeks to evaluate for resolution of hemorrhage, likely will start aspirin for secondary stroke prevention at that time  -fluoxetine started yesterday for hx depression and post-stroke motor recovery  -pt/ot/st following- recommended for [facility]  -pt evaluated by neuropsychology yesterday- severe cognitive impairment as well as depression, etoh abuse, pica.  recommends psych evaluation.  -scds  -per daughters, pt recently started drinking alcohol again.  non-compliant with meds.  evicted from apartment.  gets lost several times a day if daughters don't watch her.  -on thiamine and folate    -pt medically ready for rehab

## 2017-02-07 NOTE — PROGRESS NOTE ADULT - PROBLEM SELECTOR PLAN 4
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  Avoid antiplatelets and anticoagulation in setting of ICH.  Dispo: Pending social work for long term placement.   FULL CODE

## 2017-02-07 NOTE — CONSULT NOTE ADULT - ASSESSMENT
IMP:    DX:    REC:  Observation Status:  Additional Work-up:  Medication Recs:  Follow-Up: IMP: 51-year-old female with reported hx of depression, EtOH abuse, foreign object ingestion, BIBEMS due to AMS, with occasional incomprehensible speech and "word salad." CT head revealed L thalamic hemorrhage with local edema and mass effect, likely secondary to hypertensive angiopathy. Pt's current mental status exam and cognitive difficulties may be due to effects of CVA +/- pre-existing cognitive deficits.    DX: Alcohol Use Disorder; Hx of Depression; r/o dissociative fugue; r/o neurocognitive disorder; r/o psychosis (ingestion of foreign objects)    REC:   Observation Status: No need for C.O. at present time.  Additional Work-up: Please check TSH, B12/Folate, RPR, Vit D level.    Medication Recs: Continue fluoxetine.  Follow-Up: Request SW assist in finding psychiatric outpt follow up. IMP: 51-year-old female with reported hx of depression, EtOH abuse, foreign object ingestion, BIBEMS due to AMS, with occasional incomprehensible speech and "word salad." CT head revealed L thalamic hemorrhage with local edema and mass effect, likely secondary to hypertensive angiopathy. Pt's current mental status exam and cognitive difficulties may be due to effects of CVA +/- pre-existing cognitive deficits.    DX: Alcohol Use Disorder; Hx of Depression; r/o dissociative fugue; r/o neurocognitive disorder; r/o psychosis (ingestion of foreign objects)    REC:   Observation Status: No need for C.O. at present time.  Additional Work-up: Please check TSH, B12/Folate, RPR, Vit D level.    Medication Recs: Continue fluoxetine. Add nicotine gum and nicotine patch 14 mg to arm qdaily (patch starting 2/8 AM)  Follow-Up: Request SW assist in finding psychiatric outpt follow up.

## 2017-02-07 NOTE — CONSULT NOTE ADULT - SUBJECTIVE AND OBJECTIVE BOX
Ms. Anastasiya Ballard is a 52 yo AA woman, who presented at the [facility] after being found at [facility] with altered mental status and erratic behavior.  She was brought to [place] and found to have a R thalamic stroke.    Patient was seen at bedside.  She was unable to provide a coherent history.  Collateral was provided by the patient's 2 daughters Mini ([phone]) and Floresita ([phone]) Elio.  Patient has a protracted history of ETOH abuse, volitive behavior, and Pica (known to eat soap crystals, erasers, and make-up sponges), s/p gastric bypass. Medical history is significant for hypertension, cirrhosis of the liver, and DVT.  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  She was deemed incapacitated and assigned a guardian though [facility].    Patient earned a BA and worked for over 20 years for [employer] and stopped working shortly after 9/11.    She was born and raised in [place] and was evicted from her apartment ~1 month ago.  SSI was suppose to have been established by the guardian, although this was never done.  The patient was unable to state where she has been living - the family reported that she was suppose to be staying with a friend although went "missing" a couple of weeks ago. Patient has 3 children (33, 22, and 10yo), the youngest resides with the father.    Cognitive testing:  MMSE 19/30 generally oriented, unable to do serial 7s although able to spell world backward, 1/3 recall, difficulty with multi-step instructions, unable to write sentence or copy pentagon.  Dementia Rating Scale -2 110/144, well below the dementia cut-off (123/144).  On the DRS-2, attention is intact, initiation /perseveration is impaired, constructional skills are impaired, conceptualization is intact, and memory is impaired.  Clock drawing showed conceptual and perceptual impairments.  Patient was unable to perform a simple set shifting task.      Patient meets criteria for Major Neurocognitive disorder - multiple etiologies.  ETOH abuse/withdrawl and recent stoke may by acutely affecting cognitive functioning.    Recommendations:  1. Transition to senior living facility  2. Psychiatric care  - including detox and treatment for substance abuse  3. More comprehensive evaluation as an outpatient when patient is stabilized to determine if patient could benefit from cognitive remediation/therapy

## 2017-02-08 LAB
CULTURE RESULTS: SIGNIFICANT CHANGE UP
DNA PLOIDY SPEC FC-IMP: SIGNIFICANT CHANGE UP
JAK2 P.V617F BLD/T QL: SIGNIFICANT CHANGE UP
PTR INTERPRETATION: SIGNIFICANT CHANGE UP
SPECIMEN SOURCE: SIGNIFICANT CHANGE UP

## 2017-02-08 RX ORDER — FLUOXETINE HCL 10 MG
1 CAPSULE ORAL
Qty: 0 | Refills: 0 | COMMUNITY
Start: 2017-02-08

## 2017-02-08 RX ORDER — THIAMINE MONONITRATE (VIT B1) 100 MG
1 TABLET ORAL
Qty: 0 | Refills: 0 | COMMUNITY
Start: 2017-02-08

## 2017-02-08 RX ORDER — FOLIC ACID 0.8 MG
1 TABLET ORAL
Qty: 0 | Refills: 0 | COMMUNITY
Start: 2017-02-08

## 2017-02-08 RX ADMIN — Medication 100 MILLIGRAM(S): at 12:51

## 2017-02-08 RX ADMIN — Medication 1 TABLET(S): at 12:51

## 2017-02-08 RX ADMIN — Medication 20 MILLIGRAM(S): at 12:51

## 2017-02-08 RX ADMIN — Medication 1 MILLIGRAM(S): at 12:51

## 2017-02-08 RX ADMIN — Medication 2 MILLIGRAM(S): at 12:52

## 2017-02-08 NOTE — PROGRESS NOTE ADULT - PROBLEM SELECTOR PLAN 4
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  Avoid antiplatelets and anticoagulation in setting of ICH.  Dispo: Pending social work for long term placement.   FULL CODE SCDs  Holding HSQ in setting of left thalamic hemorrhage.  Avoid antiplatelets and anticoagulation in setting of ICH.  Dispo: Pending social work for long term placement; Patient meets criteria for Major Neurocognitive disorder - multiple etiologies.  ETOH abuse/withdrawl and recent stoke may by acutely affecting cognitive functioning and will need outpatient follow up.   FULL CODE

## 2017-02-08 NOTE — DISCHARGE NOTE ADULT - MEDICATION SUMMARY - MEDICATIONS TO TAKE
I will START or STAY ON the medications listed below when I get home from the hospital:    FLUoxetine 20 mg oral capsule  -- 1 cap(s) by mouth once a day  -- Indication: For Depression    amLODIPine 5 mg oral tablet  -- 1 tab(s) by mouth once a day  -- Indication: For Post Stroke Hypertension    Multiple Vitamins oral tablet  -- 1 tab(s) by mouth once a day  -- Indication: For Need for prophylactic measure    folic acid 1 mg oral tablet  -- 1 tab(s) by mouth once a day  -- Indication: For Need for prophylactic measure    thiamine 100 mg oral tablet  -- 1 tab(s) by mouth once a day  -- Indication: For Need for prophylactic measure

## 2017-02-08 NOTE — PROGRESS NOTE ADULT - ASSESSMENT
50yo F w/ unknown PMH other than Etoh abuse, found to have left thalamic hemorrhage with local edema and mass effect on CT head. 50yo F w/ unknown PMH other than Etoh abuse, found to have left thalamic hemorrhage with local edema and mass effect on CT head with Major neurocognitive disorder likely secondary to alcohol abuse and intra-cranial hemorrhage.

## 2017-02-08 NOTE — DISCHARGE NOTE ADULT - PLAN OF CARE
Continue to take your medications and control your blood pressure You were admitted for altered mental status and found to have a intracranial hemorrhage (a bleed in your brain) and admitted for further workup. Your blood pressures were well controlled off anti-hypertensive (blood pressure) medications but if they increase you should preferably start a low dose calcium channel blocker. You had your cholesterol measured and your LDL was less than 70, which is very well controlled. Your A1c (a tool to help diagnose diabetes) was 5.0, which means you are not a diabetic. Please follow up with  Once discharged. You have a history of alcohol abuse. Please limit your alcohol intake when discharged You were seen by neuropsychology during the end of your hospital stay after having a bleed in your brain. You were found to have several deficits with your ability to function and may benefit from further therapy outpatient, once discharged. You were admitted for altered mental status and found to have a intracranial hemorrhage (a bleed in your brain) and admitted for further workup. Your blood pressures were well controlled off anti-hypertensive (blood pressure) medications but if they increase you should preferably start a low dose calcium channel blocker. You had your cholesterol measured and your LDL was less than 70, which is very well controlled. Your A1c (a tool to help diagnose diabetes) was 5.0, which means you are not a diabetic. Please follow up with Dr. Le of neurology once discharged for further care of your stroke. You were admitted for altered mental status and found to have a intracranial hemorrhage (a bleed in your brain) and admitted for further workup. Your blood pressures were well controlled off anti-hypertensive (blood pressure) medications but if they increase you should preferably start a low dose calcium channel blocker. You had your cholesterol measured and your LDL was less than 70, which is very well controlled. Your A1c (a tool to help diagnose diabetes) was 5.0, which means you are not a diabetic. Please follow up with Dr. Novak or Rey of neurology once discharged for further care of your stroke. You were admitted for altered mental status and found to have a intracranial hemorrhage (a bleed in your brain) and admitted for further workup. Your blood pressures were well controlled off anti-hypertensive (blood pressure) medications but if they increase you should preferably start a low dose calcium channel blocker. You had your cholesterol measured and your LDL was less than 70, which is very well controlled. Your A1c (a tool to help diagnose diabetes) was 5.0, which means you are not a diabetic. Please follow up with Dr. Novak or Rey of neurology once discharged for further care of your stroke. If you do not have a primary care physician, you may call [phone] for [facility]; your information will be forwarded to them in order to have a post hospital appointment. Please try your best to have one scheduled.

## 2017-02-08 NOTE — PROGRESS NOTE ADULT - SUBJECTIVE AND OBJECTIVE BOX
INTERVAL HPI/OVERNIGHT EVENTS:  Patient was seen and examined at bedside. As per nurse and patient, no o/n events, patient resting comfortably. No complaints at this time. Patient denies fever, chills, dizziness, weakness, HA, Changes in vision, CP, palpitations, SOB, cough, N/V/D/C, dysuria, changes in bowel movements, LE edema.    VITAL SIGNS:  T(F): 97.3  HR: 73  BP: 123/61  RR: 18  SpO2: 96%  Wt(kg): --    PHYSICAL EXAM:    Constitutional: WDWN, NAD  Eyes: PERRL, EOMI, sclera non-icteric  Neck: supple, trachea midline, no masses, no JVD  Respiratory: CTA b/l, good air entry b/l, no wheezing, rhonchi, rales, without accessory muscle use and no intercostal retractions  Cardiovascular: RRR, normal S1S2, no M/R/G  Gastrointestinal: soft, NTND, no masses palpable, BS normal  Extremities: Warm, well perfused, pulses equal bilateral upper and lower extremities, no edema, no clubbing  Neurological: AAOx3, CN Grossly intact  Skin: Normal temperature, warm, dry    MEDICATIONS  (STANDING):  multivitamin 1Tablet(s) Oral daily  thiamine 100milliGRAM(s) Oral daily  folic acid 1milliGRAM(s) Oral daily  FLUoxetine 20milliGRAM(s) Oral daily    MEDICATIONS  (PRN):  nicotine  Polacrilex Gum 2milliGRAM(s) Oral every 2 hours PRN Cigarette craving      Allergies    No Known Allergies    Intolerances        LABS:                        9.0    5.4   )-----------( 254      ( 07 Feb 2017 08:04 )             29.5     07 Feb 2017 08:04    140    |  107    |  12     ----------------------------<  90     4.3     |  27     |  0.78     Ca    8.8        07 Feb 2017 08:04  Phos  2.8       07 Feb 2017 08:04  Mg     1.8       07 Feb 2017 08:04            RADIOLOGY & ADDITIONAL TESTS: INTERVAL HPI/OVERNIGHT EVENTS:  Patient was seen and examined at bedside. DEANDRE overnight. No complaints at this time.     VITAL SIGNS:  T(F): 97.3  HR: 73  BP: 123/61  RR: 18  SpO2: 96%  Wt(kg): --    PHYSICAL EXAM:    Constitutional: WDWN, NAD  Eyes: PERRL, EOMI, sclera non-icteric  Neck: supple, trachea midline, no masses, no JVD  Respiratory: CTA b/l, good air entry b/l, no wheezing, rhonchi, rales, without accessory muscle use and no intercostal retractions  Cardiovascular: RRR, normal S1S2, no M/R/G  Gastrointestinal: soft, NTND, no masses palpable, BS normal  Extremities: Warm, well perfused,    Neurological: AAOx3, CN Grossly intact, no cerebellar signs (no dysmetria, able to do finger to nose and heel to shin).   Skin: Normal temperature, warm, dry    MEDICATIONS  (STANDING):  multivitamin 1Tablet(s) Oral daily  thiamine 100milliGRAM(s) Oral daily  folic acid 1milliGRAM(s) Oral daily  FLUoxetine 20milliGRAM(s) Oral daily    MEDICATIONS  (PRN):  nicotine  Polacrilex Gum 2milliGRAM(s) Oral every 2 hours PRN Cigarette craving      Allergies    No Known Allergies    Intolerances        LABS:                        9.0    5.4   )-----------( 254      ( 07 Feb 2017 08:04 )             29.5     07 Feb 2017 08:04    140    |  107    |  12     ----------------------------<  90     4.3     |  27     |  0.78     Ca    8.8        07 Feb 2017 08:04  Phos  2.8       07 Feb 2017 08:04  Mg     1.8       07 Feb 2017 08:04            RADIOLOGY & ADDITIONAL TESTS:

## 2017-02-08 NOTE — PROGRESS NOTE ADULT - PROBLEM SELECTOR PLAN 1
CT head significant for left thalamic hemorrhage with local edema and mass effect. MRI head showing L thalamic hematoma with surrounding edema + chronic b/l basal ganglia and pontine infarcts. ESR/CRP elevated. ECHO without PFO. Carotid US w/o hemodynamically significant stenosis.  - C/w Lipitor 80mg po qd. ASA/Plavix contraindicated given ICH  - Consider psychiatric consult for underlying mental health disease - pt was previously on Risperidone as per PMD; Per Dr. Novak, patient seen by neurospychologist who recommended psychiatry consult as patient with cognitive impairment, PICA like symptoms (eating erasers and other strange objects), and depression.   - SBP goal < 140, currently in 130's  - Hypercoagulative workup sent, f/u results: Factor VIII elevated (likely 2/2 liver disease), elevated homocystein level, NIYA level elevated titer with speckled pattern.  -F/u psychiatry recs. appreciated. Continue with fluoxetine.   -Blood cultures no growth to date. CT head significant for left thalamic hemorrhage with local edema and mass effect. MRI head showing L thalamic hematoma with surrounding edema + chronic b/l basal ganglia and pontine infarcts. ESR/CRP elevated. ECHO without PFO. Carotid US w/o hemodynamically significant stenosis.  - C/w Lipitor 80mg po qd. ASA/Plavix contraindicated given ICH  - SBP goal < 140, currently in 130's  - Hypercoagulative workup sent, f/u results: Factor VIII elevated (likely 2/2 liver disease), elevated homocystein level, NIYA level elevated titer with speckled pattern.  -F/u psychiatry recs. appreciated. Continue with fluoxetine.   -Blood cultures no growth to date.

## 2017-02-08 NOTE — DISCHARGE NOTE ADULT - MEDICATION SUMMARY - MEDICATIONS TO STOP TAKING
I will STOP taking the medications listed below when I get home from the hospital:    metoprolol tartrate 25 mg oral tablet  -- 1 tab(s) by mouth 2 times a day    enalapril 5 mg oral tablet  -- 1 tab(s) by mouth once a day

## 2017-02-08 NOTE — DISCHARGE NOTE ADULT - HOSPITAL COURSE
Pt is a 52yo F w/ PMH of HTN (noncompliant w/ medications), liver dysfunction 2/2 Etoh abuse, DVT previously on coumadin (noncompliant), unknown psychiatric hx BIBEMS to [facility] 3 days ago after being found leaning against a wall at [facility] for 3.5 hrs. CT head performed emergently showed L thalamic hemorrhage with local edema and mass effect likely to be 2/2 hypertensive angiopathy with chronic small vessel lacunar infarcts. Transported to [facility] and admitted to [facility] for continued evaluation. Antiplatelet and anticoagulant therapy were contraindicated due to intracerebral hemorrhage and pt was started on Atorvastatin 80 mg. Initially, she was A&O x 1, which improved to x2 (person and place) over the next 2 days. ECHO performed was unremarkable. Xray hip and foot without signs of fracture. US RP (2/3) not showing any signs of hydronephrosis, but with signs of possible hepatic steatosis. Pt is a 50yo F w/ PMH of HTN (noncompliant w/ medications), liver dysfunction 2/2 Etoh abuse, DVT previously on coumadin (noncompliant), unknown psychiatric hx BIBEMS to [facility] 3 days ago after being found leaning against a wall at [place] for 3.5 hrs. CT head performed emergently showed L thalamic hemorrhage with local edema and mass effect likely to be 2/2 hypertensive angiopathy with chronic small vessel lacunar infarcts. Transported to [facility] and admitted [facility] for continued evaluation. Antiplatelet and anticoagulant therapy were contraindicated due to intracerebral hemorrhage and pt was started on   Initially, she was A&O x 1, which improved to x2 (person and place) over the next 2 days. ECHO performed was unremarkable. Xray hip and foot without signs of fracture. US RP (2/3) not showing any signs of hydronephrosis, but with signs of possible hepatic steatosis. Patient had hypercoagulable workup which was notable for elevated homocystein level and positive NIYA titer in speckled pattern. She was seen by neuropsychiatry/psychology which noted severe neurocognitive deficits and that she may benefit from therapy in the outpatient setting. She was also seen by psychiatry which recommended continuing her on fluoxetine for her depression. Patient will be going to [facility] once discharged and will require a long term care facility as she does not have the ability to live on her own. She will follow up with Pt is a 52yo F w/ PMH of HTN (noncompliant w/ medications), liver dysfunction 2/2 Etoh abuse, DVT previously on coumadin (noncompliant), unknown psychiatric hx BIBEMS to [facility] 3 days ago after being found leaning against a wall at [place] for 3.5 hrs. CT head performed emergently showed L thalamic hemorrhage with local edema and mass effect likely to be 2/2 hypertensive angiopathy with chronic small vessel lacunar infarcts. Transported to [facility] and admitted [facility] for continued evaluation. Antiplatelet and anticoagulant therapy were contraindicated due to intracerebral hemorrhage and pt was started on   Initially, she was A&O x 1, which improved to x2 (person and place) over the next 2 days. ECHO performed was unremarkable. Xray hip and foot without signs of fracture. US RP (2/3) not showing any signs of hydronephrosis, but with signs of possible hepatic steatosis. Patient had hypercoagulable workup which was notable for elevated homocystein level and positive NIYA titer in speckled pattern. She was seen by neuropsychiatry/psychology which noted severe neurocognitive deficits and that she may benefit from therapy in the outpatient setting. She was also seen by psychiatry which recommended continuing her on fluoxetine for her depression. Patient will be going to [facility] once discharged and will require a long term care facility as she does not have the ability to live on her own. She will follow up with Dr. Le once discharged for continued care as well as psychiatry once discharged. Pt is a 52yo F w/ PMH of HTN (noncompliant w/ medications), liver dysfunction 2/2 Etoh abuse, DVT previously on coumadin (noncompliant), unknown psychiatric hx BIBEMS to [facility] 3 days ago after being found leaning against a wall at [place] for 3.5 hrs. CT head performed emergently showed L thalamic hemorrhage with local edema and mass effect likely to be 2/2 hypertensive angiopathy with chronic small vessel lacunar infarcts. Transported to [facility] and admitted [facility] for continued evaluation. Antiplatelet and anticoagulant therapy were contraindicated due to intracerebral hemorrhage and pt was started on   Initially, she was A&O x 1, which improved to x2 (person and place) over the next 2 days. ECHO performed was unremarkable. Xray hip and foot without signs of fracture. US RP (2/3) not showing any signs of hydronephrosis, but with signs of possible hepatic steatosis. Patient had hypercoagulable workup which was notable for elevated homocystein level and positive NIYA titer in speckled pattern. She was seen by neuropsychiatry/psychology which noted severe neurocognitive deficits and that she may benefit from therapy in the outpatient setting. She was also seen by psychiatry which recommended continuing her on fluoxetine for her depression. Patient will be going to [facility] once discharged and will require a long term care facility as she does not have the ability to live on her own. She will follow up with Dr. Le/Kishore once discharged for continued care as well as psychiatry once discharged. Pt is a 52yo F w/ PMH of HTN (noncompliant w/ medications), liver dysfunction 2/2 Etoh abuse, DVT previously on coumadin (noncompliant), unknown psychiatric hx BIBEMS to [facility] 3 days ago after being found leaning against a wall at [place] for 3.5 hrs. CT head performed emergently showed L thalamic hemorrhage with local edema and mass effect likely to be 2/2 hypertensive angiopathy with chronic small vessel lacunar infarcts. Transported to [facility] and admitted [facility] for continued evaluation. Antiplatelet and anticoagulant therapy were contraindicated due to intracerebral hemorrhage and pt was started on   Initially, she was A&O x 1, which improved to x2 (person and place) over the next 2 days. ECHO performed was unremarkable. Xray hip and foot without signs of fracture. US RP (2/3) not showing any signs of hydronephrosis, but with signs of possible hepatic steatosis. Patient had hypercoagulable workup which was notable for elevated homocystein level and positive NIYA titer in speckled pattern. She was seen by neuropsychiatry/psychology which noted severe neurocognitive deficits and that she may benefit from therapy in the outpatient setting. She was also seen by psychiatry which recommended continuing her on fluoxetine for her depression. Patient will be going to [facility] once discharged and will require a long term care facility as she does not have the ability to live on her own. She will follow up with Dr. Le/Kishore once discharged for continued care as well as psychiatry once discharged. Her information will be given to [facility] associates to follow up with once discharged. Pt is a 50yo F w/ PMH of HTN (noncompliant w/ medications), liver dysfunction 2/2 Etoh abuse, DVT previously on coumadin (noncompliant), unknown psychiatric hx BIBEMS to [facility] 3 days ago after being found leaning against a wall at [place] for 3.5 hrs. CT head performed emergently showed L thalamic hemorrhage with local edema and mass effect likely to be 2/2 hypertensive angiopathy with chronic small vessel lacunar infarcts. Transported to [facility] and admitted [facility] for continued evaluation. Antiplatelet and anticoagulant therapy were contraindicated due to intracerebral hemorrhage and pt was started on   Initially, she was A&O x 1, which improved to x2 (person and place) over the next 2 days. ECHO performed was unremarkable. Xray hip and foot without signs of fracture. US RP (2/3) not showing any signs of hydronephrosis, but with signs of possible hepatic steatosis. Patient had hypercoagulable workup which was notable for elevated homocystein level and positive NIYA titer in speckled pattern. She was seen by neuropsychiatry/psychology which noted severe neurocognitive deficits and that she may benefit from therapy in the outpatient setting. She was also seen by psychiatry which recommended continuing her on fluoxetine for her depression. Patient will be going to [facility] once discharged per physical therapy recommendations. She will follow up with Dr. Le/Kishore once discharged for continued care as well as psychiatry once discharged. Her information will be given to [facility] medicine associates to follow up with once discharged.

## 2017-02-08 NOTE — DISCHARGE NOTE ADULT - COMMUNITY RESOURCES
[facility]  [id number], [place] [address]  Phone: [phone]

## 2017-02-08 NOTE — DISCHARGE NOTE ADULT - NS AS DC STROKE ED MATERIALS
Prescribed Medications/Stroke Warning Signs and Symptoms/Call 911 for Stroke/Risk Factors for Stroke/Stroke Education Booklet/Need for Followup After Discharge

## 2017-02-08 NOTE — DISCHARGE NOTE ADULT - PATIENT PORTAL LINK FT
“You can access the [facility] Patient Portal, offered by [facility], by registering with the following website: [email or web address]”

## 2017-02-08 NOTE — DISCHARGE NOTE ADULT - CARE PLAN
Principal Discharge DX:	Nontraumatic intracerebral hemorrhage, unspecified cerebral location, unspecified laterality  Goal:	Continue to take your medications and control your blood pressure  Instructions for follow-up, activity and diet:	You were admitted for altered mental status and found to have a intracranial hemorrhage (a bleed in your brain) and admitted for further workup. Your blood pressures were well controlled off anti-hypertensive (blood pressure) medications but if they increase you should preferably start a low dose calcium channel blocker. You had your cholesterol measured and your LDL was less than 70, which is very well controlled. Your A1c (a tool to help diagnose diabetes) was 5.0, which means you are not a diabetic. Please follow up with  Once discharged.  Secondary Diagnosis:	Alcohol abuse  Instructions for follow-up, activity and diet:	You have a history of alcohol abuse. Please limit your alcohol intake when discharged  Secondary Diagnosis:	Neurocognitive deficits  Instructions for follow-up, activity and diet:	You were seen by neuropsychology during the end of your hospital stay after having a bleed in your brain. You were found to have several deficits with your ability to function and may benefit from further therapy outpatient, once discharged. Principal Discharge DX:	Nontraumatic intracerebral hemorrhage, unspecified cerebral location, unspecified laterality  Goal:	Continue to take your medications and control your blood pressure  Instructions for follow-up, activity and diet:	You were admitted for altered mental status and found to have a intracranial hemorrhage (a bleed in your brain) and admitted for further workup. Your blood pressures were well controlled off anti-hypertensive (blood pressure) medications but if they increase you should preferably start a low dose calcium channel blocker. You had your cholesterol measured and your LDL was less than 70, which is very well controlled. Your A1c (a tool to help diagnose diabetes) was 5.0, which means you are not a diabetic. Please follow up with Dr. Le of neurology once discharged for further care of your stroke.  Secondary Diagnosis:	Alcohol abuse  Instructions for follow-up, activity and diet:	You have a history of alcohol abuse. Please limit your alcohol intake when discharged  Secondary Diagnosis:	Neurocognitive deficits  Instructions for follow-up, activity and diet:	You were seen by neuropsychology during the end of your hospital stay after having a bleed in your brain. You were found to have several deficits with your ability to function and may benefit from further therapy outpatient, once discharged. Principal Discharge DX:	Nontraumatic intracerebral hemorrhage, unspecified cerebral location, unspecified laterality  Goal:	Continue to take your medications and control your blood pressure  Instructions for follow-up, activity and diet:	You were admitted for altered mental status and found to have a intracranial hemorrhage (a bleed in your brain) and admitted for further workup. Your blood pressures were well controlled off anti-hypertensive (blood pressure) medications but if they increase you should preferably start a low dose calcium channel blocker. You had your cholesterol measured and your LDL was less than 70, which is very well controlled. Your A1c (a tool to help diagnose diabetes) was 5.0, which means you are not a diabetic. Please follow up with Dr. Novak or Rey of neurology once discharged for further care of your stroke.  Secondary Diagnosis:	Alcohol abuse  Instructions for follow-up, activity and diet:	You have a history of alcohol abuse. Please limit your alcohol intake when discharged  Secondary Diagnosis:	Neurocognitive deficits  Instructions for follow-up, activity and diet:	You were seen by neuropsychology during the end of your hospital stay after having a bleed in your brain. You were found to have several deficits with your ability to function and may benefit from further therapy outpatient, once discharged. Principal Discharge DX:	Nontraumatic intracerebral hemorrhage, unspecified cerebral location, unspecified laterality  Goal:	Continue to take your medications and control your blood pressure  Instructions for follow-up, activity and diet:	You were admitted for altered mental status and found to have a intracranial hemorrhage (a bleed in your brain) and admitted for further workup. Your blood pressures were well controlled off anti-hypertensive (blood pressure) medications but if they increase you should preferably start a low dose calcium channel blocker. You had your cholesterol measured and your LDL was less than 70, which is very well controlled. Your A1c (a tool to help diagnose diabetes) was 5.0, which means you are not a diabetic. Please follow up with Dr. Novak or Rey of neurology once discharged for further care of your stroke. If you do not have a primary care physician, you may call [phone] for [facility]; your information will be forwarded to them in order to have a post hospital appointment. Please try your best to have one scheduled.  Secondary Diagnosis:	Alcohol abuse  Instructions for follow-up, activity and diet:	You have a history of alcohol abuse. Please limit your alcohol intake when discharged  Secondary Diagnosis:	Neurocognitive deficits  Instructions for follow-up, activity and diet:	You were seen by neuropsychology during the end of your hospital stay after having a bleed in your brain. You were found to have several deficits with your ability to function and may benefit from further therapy outpatient, once discharged. Principal Discharge DX:	Nontraumatic intracerebral hemorrhage, unspecified cerebral location, unspecified laterality  Goal:	Continue to take your medications and control your blood pressure  Instructions for follow-up, activity and diet:	You were admitted for altered mental status and found to have a intracranial hemorrhage (a bleed in your brain) and admitted for further workup. Your blood pressures were well controlled off anti-hypertensive (blood pressure) medications but if they increase you should preferably start a low dose calcium channel blocker. You had your cholesterol measured and your LDL was less than 70, which is very well controlled. Your A1c (a tool to help diagnose diabetes) was 5.0, which means you are not a diabetic. Please follow up with Dr. Novak or Rey of neurology once discharged for further care of your stroke. If you do not have a primary care physician, you may call [phone] for [facility]; your information will be forwarded to them in order to have a post hospital appointment. Please try your best to have one scheduled.  Secondary Diagnosis:	Alcohol abuse  Instructions for follow-up, activity and diet:	You have a history of alcohol abuse. Please limit your alcohol intake when discharged  Secondary Diagnosis:	Neurocognitive deficits  Instructions for follow-up, activity and diet:	You were seen by neuropsychology during the end of your hospital stay after having a bleed in your brain. You were found to have several deficits with your ability to function and may benefit from further therapy outpatient, once discharged. Principal Discharge DX:	Nontraumatic intracerebral hemorrhage, unspecified cerebral location, unspecified laterality  Goal:	Continue to take your medications and control your blood pressure  Instructions for follow-up, activity and diet:	You were admitted for altered mental status and found to have a intracranial hemorrhage (a bleed in your brain) and admitted for further workup. Your blood pressures were well controlled off anti-hypertensive (blood pressure) medications but if they increase you should preferably start a low dose calcium channel blocker. You had your cholesterol measured and your LDL was less than 70, which is very well controlled. Your A1c (a tool to help diagnose diabetes) was 5.0, which means you are not a diabetic. Please follow up with Dr. Novak or Rey of neurology once discharged for further care of your stroke. If you do not have a primary care physician, you may call [phone] for [facility]; your information will be forwarded to them in order to have a post hospital appointment. Please try your best to have one scheduled.  Secondary Diagnosis:	Alcohol abuse  Instructions for follow-up, activity and diet:	You have a history of alcohol abuse. Please limit your alcohol intake when discharged  Secondary Diagnosis:	Neurocognitive deficits  Instructions for follow-up, activity and diet:	You were seen by neuropsychology during the end of your hospital stay after having a bleed in your brain. You were found to have several deficits with your ability to function and may benefit from further therapy outpatient, once discharged. Principal Discharge DX:	Nontraumatic intracerebral hemorrhage, unspecified cerebral location, unspecified laterality  Goal:	Continue to take your medications and control your blood pressure  Instructions for follow-up, activity and diet:	You were admitted for altered mental status and found to have a intracranial hemorrhage (a bleed in your brain) and admitted for further workup. Your blood pressures were well controlled off anti-hypertensive (blood pressure) medications but if they increase you should preferably start a low dose calcium channel blocker. You had your cholesterol measured and your LDL was less than 70, which is very well controlled. Your A1c (a tool to help diagnose diabetes) was 5.0, which means you are not a diabetic. Please follow up with Dr. Novak or Rey of neurology once discharged for further care of your stroke. If you do not have a primary care physician, you may call [phone] for [facility]; your information will be forwarded to them in order to have a post hospital appointment. Please try your best to have one scheduled.  Secondary Diagnosis:	Alcohol abuse  Instructions for follow-up, activity and diet:	You have a history of alcohol abuse. Please limit your alcohol intake when discharged  Secondary Diagnosis:	Neurocognitive deficits  Instructions for follow-up, activity and diet:	You were seen by neuropsychology during the end of your hospital stay after having a bleed in your brain. You were found to have several deficits with your ability to function and may benefit from further therapy outpatient, once discharged.

## 2017-02-08 NOTE — DISCHARGE NOTE ADULT - CARE PROVIDER_API CALL
Jens Le), Neurology; Vascular Neurology  [address] [place] [address]  Phone: [phone]  Fax: [phone] Jens Le), Neurology; Vascular Neurology  [address] [place] [address]  Phone: [phone]  Fax: [phone]

## 2017-02-08 NOTE — PROGRESS NOTE ADULT - SUBJECTIVE AND OBJECTIVE BOX
Stroke Neurology Follow-Up Visit    Overnight:    Exam:  T(F): 97.3, Max: 99.4 (02-07 @ 21:00)  HR: 73 (73 - 82)  BP: 142/73 (104/85 - 144/80)  RR: 18 (17 - 18)  SpO2: 94% (94% - 99%)    Gen: Lying in bed, calm, cooperative, in NAD  Neuro:  Mental status: Awake, alert and oriented x3. Follows commands. Recent and remote memory intact.  Naming, repetition and comprehension intact.  Attention/concentration intact.  No dysarthria, no aphasia.      Cranial nerves: Pupils equally round and reactive to light, visual fields full, no nystagmus, extraocular muscles intact, V1 through V3 intact bilaterally and symmetric, no facial droop, hearing intact to finger rub, palate elevation symmetric, tongue was midline, sternocleidomastoid/shoulder shrug strength bilaterally 5/5.    Motor:  No pronator drift. No fix. Normal bulk and tone, strength 5/5 in bilateral upper and lower extremities.   strength 5/5.    Sensation: Intact to light touch, proprioception, vibration, temperature, pinprick.  No neglect.   Coordination: No dysmetria on finger-to-nose and heel-to-shin.  No clumsiness. Rapid alternating movements intact and symmetric.   Reflexes: 2+ in upper and lower extremities, absent Babinski bilaterally  Gait: Narrow and steady. No ataxia.  Romberg negative    Medications:  MEDICATIONS  (STANDING):  multivitamin 1Tablet(s) Oral daily  thiamine 100milliGRAM(s) Oral daily  folic acid 1milliGRAM(s) Oral daily  FLUoxetine 20milliGRAM(s) Oral daily    MEDICATIONS  (PRN):  nicotine  Polacrilex Gum 2milliGRAM(s) Oral every 2 hours PRN Cigarette craving      Labs:                        9.0    5.4   )-----------( 254      ( 07 Feb 2017 08:04 )             29.5     07 Feb 2017 08:04    140    |  107    |  12     ----------------------------<  90     4.3     |  27     |  0.78     Ca    8.8        07 Feb 2017 08:04  Phos  2.8       07 Feb 2017 08:04  Mg     1.8       07 Feb 2017 08:04          Cholesterol, Serum: 113 mg/dL  HDL Cholesterol, Serum: 34 mg/dL  Triglycerides, Serum: 60 mg/dL  Hemoglobin A1C, Whole Blood: 5.0 %  Thyroid Stimulating Hormone, Serum: 0.832 uIU/mL      Radiology:      Assessment & Plan: Stroke Neurology Follow-Up Visit    Pt seen and examined. No acute events overnight.   Pt without any complaints at this time. No headache, nausea, vomiting. Denies weakness or numbness.     Exam:  T(F): 97.3, Max: 99.4 (02-07 @ 21:00)  HR: 73 (73 - 82)  BP: 123/61 (104/85 - 144/80)  RR: 18 (17 - 18)  SpO2: 94% (94% - 99%)    Gen: Sitting up in chair, calm, cooperative, in NAD.   Neuro:  Mental status: Awake, alert and oriented x 2 to self and month. Stated 2007 for year. Required prompting for location-states she is unsure why she is here. Poor recall, 1/3.  Tangential.  Naming and repetition intact. Requires repeated instructions for complex commands.  Follows all simple commands.      Cranial nerves: Pupils equally round and reactive to light, 3 mm. Visual fields full, no nystagmus, extraocular muscles intact, V1 through V3 intact bilaterally and symmetric, face symmetric, hearing intact to finger rub, palate elevation symmetric, tongue was midline, sternocleidomastoid/shoulder shrug strength bilaterally 5/5.    Motor:  Normal bulk and tone.  strength strong bilaterally. RUE 5/5 bicep, 4+/5 tricep, 4+/5 deltoid. Left arm 5/5, right leg 4+/5, left leg 5/5.    Sensation: Intact and symmetric to light touch intact bilaterally.  No neglect.   Coordination: No dysmetria on finger-to-nose and heel-to-shin. Rapid alternating movements slightly slower on right.     MEDICATIONS  (STANDING):  multivitamin 1Tablet(s) Oral daily  thiamine 100milliGRAM(s) Oral daily  folic acid 1milliGRAM(s) Oral daily  FLUoxetine 20milliGRAM(s) Oral daily    MEDICATIONS  (PRN):  nicotine  Polacrilex Gum 2milliGRAM(s) Oral every 2 hours PRN Cigarette craving      Labs:                        9.0    5.4   )-----------( 254      ( 07 Feb 2017 08:04 )             29.5     07 Feb 2017 08:04    140    |  107    |  12     ----------------------------<  90     4.3     |  27     |  0.78     Ca    8.8        07 Feb 2017 08:04  Phos  2.8       07 Feb 2017 08:04  Mg     1.8       07 Feb 2017 08:04      Cholesterol, Serum: 113 mg/dL  HDL Cholesterol, Serum: 34 mg/dL  Triglycerides, Serum: 60 mg/dL  LDL 67  Hemoglobin A1C, Whole Blood: 5.0 %  Thyroid Stimulating Hormone, Serum: 0.832 uIU/mL  B12 373    Utox negative     Radiology:  2/2 CT head:   IMPRESSION: Left thalamic hemorrhage with local edema and mass effect. This is felt most likely to be secondary to hypertensive angiopathy given that there are other more chronic sites of small vessel lacunar injury with advanced cerebral white matter disease. Clinical correlation is suggested.  Follow-up to resolution is recommended to exclude alternate etiologies.    2/2 MRI head:   IMPRESSION:  1. Left thalamic hematoma with surrounding parenchymal edema and expansion of the left thalamus as further described above.  2. Microangiopathic ischemic disease. Chronic bilateral basal ganglia and pontine infarcts.  3. No hydrocephalus or acute vascular territorial infarct.    2/3 - Carotid dopplers:  No hemodynamically significant stenosis.    2/3 - Echocardiogram with bubble:  Mild concentric left ventricular hypertrophy. The left ventricular wall motion is normal. The left ventricular ejection fraction = 60-65%. The left atrial size is normal. Injection of agitated saline contrast documented no interatrial shunt. Right atrial size is normal. The right ventricle is normal in size and function. No evidence for any hemodynamically significant valvular disease.    Assessment & Plan:  51 year-old right handed woman with unknown medical history other than etoh abuse, depression, pica, and medication non-compliance presents on 2/2 with confusion and was found to have a left thalamic intracerebral hemorrhagic likely due to uncontrolled hypertension and multiple vascular risk factors.      -currently BP well-controlled.  If persistently greater than 130/80, start low dose calcium channel blocker  -no antiplatelet/anticoagulation/statin at this time given hemorrhage.  Would repeat ct head in four weeks to evaluate for resolution of hemorrhage, likely will start aspirin for secondary stroke prevention at that time  -fluoxetine started for hx depression and post-stroke motor recovery  -pt/ot/st following- recommended for [facility]  -pt evaluated by neuropsychology - severe cognitive impairment as well as depression, etoh abuse, pica.  Recommends psych evaluation.  -evaluated by psych - appreciate recs   -scds for DVT prophylaxis   -per daughters, pt recently started drinking alcohol again.  non-compliant with meds.  evicted from apartment.  gets lost several times a day if daughters don't watch her.  -on thiamine and folate    -stroke work up complete, pt medically ready for rehab

## 2017-02-09 DIAGNOSIS — F10.20 ALCOHOL DEPENDENCE, UNCOMPLICATED: ICD-10-CM

## 2017-02-09 LAB
CONFIRM APTT STACLOT: POSITIVE
CULTURE RESULTS: SIGNIFICANT CHANGE UP
CULTURE RESULTS: SIGNIFICANT CHANGE UP
DRVVT SCREEN TO CONFIRM RATIO: SIGNIFICANT CHANGE UP
LA NT DPL PPP QL: 36 SEC — SIGNIFICANT CHANGE UP
SPECIMEN SOURCE: SIGNIFICANT CHANGE UP
SPECIMEN SOURCE: SIGNIFICANT CHANGE UP
THROMBIN TIME: 20 SEC — SIGNIFICANT CHANGE UP (ref 17.6–24)
VIT B1 SERPL-MCNC: 57.4 NMOL/L — LOW (ref 66.5–200)

## 2017-02-09 RX ORDER — AMLODIPINE BESYLATE 2.5 MG/1
5 TABLET ORAL DAILY
Qty: 0 | Refills: 0 | Status: DISCONTINUED | OUTPATIENT
Start: 2017-02-09 | End: 2017-02-10

## 2017-02-09 RX ADMIN — Medication 1 MILLIGRAM(S): at 11:31

## 2017-02-09 RX ADMIN — Medication 1 TABLET(S): at 11:31

## 2017-02-09 RX ADMIN — Medication 100 MILLIGRAM(S): at 11:31

## 2017-02-09 RX ADMIN — Medication 20 MILLIGRAM(S): at 11:31

## 2017-02-09 RX ADMIN — AMLODIPINE BESYLATE 5 MILLIGRAM(S): 2.5 TABLET ORAL at 11:38

## 2017-02-09 NOTE — PROGRESS NOTE ADULT - PROBLEM SELECTOR PROBLEM 2
Alcohol abuse
Renal failure
Renal failure
MEHRDAD (acute kidney injury)

## 2017-02-09 NOTE — PROGRESS NOTE ADULT - PROBLEM SELECTOR PLAN 1
CT head significant for left thalamic hemorrhage with local edema and mass effect. MRI head showing L thalamic hematoma with surrounding edema + chronic b/l basal ganglia and pontine infarcts. ESR/CRP elevated. ECHO without PFO. Carotid US w/o hemodynamically significant stenosis.  - C/w Lipitor 80mg po qd. ASA/Plavix contraindicated given ICH  - SBP goal < 140, currently in 130's  - Hypercoagulative workup sent, f/u results: Factor VIII elevated (likely 2/2 liver disease), elevated homocystein level, NIYA level elevated titer with speckled pattern.  -F/u psychiatry recs. appreciated. Continue with fluoxetine.   -Blood cultures no growth to date. CT head significant for left thalamic hemorrhage with local edema and mass effect. MRI head showing L thalamic hematoma with surrounding edema + chronic b/l basal ganglia and pontine infarcts. ESR/CRP elevated. ECHO without PFO. Carotid US w/o hemodynamically significant stenosis.  - C/w Lipitor 80mg po qd. ASA/Plavix contraindicated given ICH  - SBP goal < 140, BP 170s/160s this AM; started norvasc 5mg PO.   - Hypercoagulative workup sent, f/u results: Factor VIII elevated (likely 2/2 liver disease), elevated homocystein level, NIYA level elevated titer with speckled pattern.  -F/u psychiatry recs. appreciated. Continue with fluoxetine.   -Blood cultures no growth to date.

## 2017-02-09 NOTE — PROGRESS NOTE ADULT - PROBLEM SELECTOR PROBLEM 1
Altered mental status, unspecified altered mental status type
Alcohol use disorder, moderate, in controlled environment

## 2017-02-09 NOTE — PROGRESS NOTE ADULT - ASSESSMENT
51 year old female with hx of depression, EtOH abuse, foreign object ingestion, BIBEMS due to AMS, with occasional incomprehensible speech and "word salad." CT head revealed L thalamic hemorrhage with local edema and mass effect, likely secondary to hypertensive angiopathy. Pt's current mental status exam and cognitive difficulties may be due to effects of CVA +/- pre-existing cognitive deficits. Patient is going to be discharged to rehab.

## 2017-02-09 NOTE — PROGRESS NOTE ADULT - PROBLEM SELECTOR PROBLEM 4
Need for prophylactic measure
Nutrition, metabolism, and development symptoms

## 2017-02-09 NOTE — PROGRESS NOTE ADULT - PROBLEM SELECTOR PLAN 1
Observation Status: Per unit protocol as patient denies HI/SI.  Additional Work-up: Please consider Vitamin D level and RPR.  Medications: Supplement Folate.    Case discussed with Dr. Cartagena

## 2017-02-09 NOTE — PROGRESS NOTE ADULT - SUBJECTIVE AND OBJECTIVE BOX
INTERVAL HPI/OVERNIGHT EVENTS:  Patient was seen and examined at bedside.  DEANDRE overnight.     VITAL SIGNS:  T(F): 98.2  HR: 80  BP: 153/84  RR: 18  SpO2: 95%  Wt(kg): --    PHYSICAL EXAM:    Constitutional: WDWN, NAD  Eyes: PERRL, EOMI, sclera non-icteric  Neck: supple, trachea midline, no masses, no JVD  Respiratory: CTA b/l, good air entry b/l, no wheezing, rhonchi, rales, without accessory muscle use and no intercostal retractions  Cardiovascular: RRR, normal S1S2, no M/R/G  Gastrointestinal: soft, NTND, no masses palpable, BS normal  Extremities: Warm, well perfused, pulses equal bilateral upper and lower extremities, no edema, no clubbing  Neurological: AAOx3, CN Grossly intact  Skin: Normal temperature, warm, dry    MEDICATIONS  (STANDING):  multivitamin 1Tablet(s) Oral daily  thiamine 100milliGRAM(s) Oral daily  folic acid 1milliGRAM(s) Oral daily  FLUoxetine 20milliGRAM(s) Oral daily    MEDICATIONS  (PRN):  nicotine  Polacrilex Gum 2milliGRAM(s) Oral every 2 hours PRN Cigarette craving      Allergies    No Known Allergies    Intolerances        LABS:                        9.0    5.4   )-----------( 254      ( 07 Feb 2017 08:04 )             29.5     07 Feb 2017 08:04    140    |  107    |  12     ----------------------------<  90     4.3     |  27     |  0.78     Ca    8.8        07 Feb 2017 08:04  Phos  2.8       07 Feb 2017 08:04  Mg     1.8       07 Feb 2017 08:04            RADIOLOGY & ADDITIONAL TESTS: INTERVAL HPI/OVERNIGHT EVENTS:  Patient was seen and examined at bedside.  DEANDRE overnight. Patient seen sitting up in bed, comfortable.     VITAL SIGNS:  T(F): 98.2  HR: 80  BP: 153/84  RR: 18  SpO2: 95%  Wt(kg): --    PHYSICAL EXAM:    Constitutional: WDWN, NAD  Eyes: PERRL, EOMI, sclera non-icteric  Neck: supple, trachea midline, no masses, no JVD  Respiratory: CTA b/l, good air entry b/l, no wheezing, rhonchi, rales, without accessory muscle use and no intercostal retractions  Cardiovascular: RRR, normal S1S2, no M/R/G  Gastrointestinal: soft, NTND, no masses palpable, BS normal  Extremities: Warm, well perfused, pulses equal bilateral upper and lower extremities, no edema, no clubbing  Neurological: AAOx2 (thinks its 1967), CN Grossly intact  Skin: Normal temperature, warm, dry    MEDICATIONS  (STANDING):  multivitamin 1Tablet(s) Oral daily  thiamine 100milliGRAM(s) Oral daily  folic acid 1milliGRAM(s) Oral daily  FLUoxetine 20milliGRAM(s) Oral daily    MEDICATIONS  (PRN):  nicotine  Polacrilex Gum 2milliGRAM(s) Oral every 2 hours PRN Cigarette craving      Allergies    No Known Allergies    Intolerances        LABS:                        9.0    5.4   )-----------( 254      ( 07 Feb 2017 08:04 )             29.5     07 Feb 2017 08:04    140    |  107    |  12     ----------------------------<  90     4.3     |  27     |  0.78     Ca    8.8        07 Feb 2017 08:04  Phos  2.8       07 Feb 2017 08:04  Mg     1.8       07 Feb 2017 08:04            RADIOLOGY & ADDITIONAL TESTS:

## 2017-02-09 NOTE — PROGRESS NOTE ADULT - SUBJECTIVE AND OBJECTIVE BOX
Stroke Neurology Follow-Up Visit    Pt seen and examined. No acute events overnight.   Currently without complaints. No headache, nausea, vomiting.     Exam:  T(F): 99.1, Max: 99.1 (02-09 @ 16:16)  HR: 74 (74 - 80)  BP: 160/79 (153/84 - 176/96)  RR: 17 (17 - 18)  SpO2: 95% (95% - 98%)    Gen: Lying in bed, calm, cooperative, in NAD.   Neuro:  Mental status: Awake, alert and oriented to self and [facility].  Naming and repetition intact. Requires repeated instructions for complex commands.  Follows all simple commands.      Cranial nerves: Pupils equally round and reactive to light. Visual fields full, no nystagmus, extraocular muscles intact, V1 through V3 intact bilaterally and symmetric, face symmetric, hearing intact to finger rub, palate elevation symmetric, tongue was midline, sternocleidomastoid/shoulder shrug strength bilaterally 5/5.    Motor:  Normal bulk and tone.  strength strong bilaterally. RUE 5/5 bicep, 4+/5 tricep, 4+/5 deltoid. Left arm 5/5, right leg 4+/5, left leg 5/5.    Sensation: Intact and symmetric to light touch intact bilaterally.  No neglect.     MEDICATIONS  (STANDING):  multivitamin 1Tablet(s) Oral daily  thiamine 100milliGRAM(s) Oral daily  folic acid 1milliGRAM(s) Oral daily  FLUoxetine 20milliGRAM(s) Oral daily  amLODIPine   Tablet 5milliGRAM(s) Oral daily    MEDICATIONS  (PRN):  nicotine  Polacrilex Gum 2milliGRAM(s) Oral every 2 hours PRN Cigarette craving      Labs:    Cholesterol, Serum: 113 mg/dL  HDL Cholesterol, Serum: 34 mg/dL  Triglycerides, Serum: 60 mg/dL  LDL 67  Hemoglobin A1C, Whole Blood: 5.0 %  Thyroid Stimulating Hormone, Serum: 0.832 uIU/mL      Radiology:  2/2 CT head:   IMPRESSION: Left thalamic hemorrhage with local edema and mass effect. This is felt most likely to be secondary to hypertensive angiopathy given that there are other more chronic sites of small vessel lacunar injury with advanced cerebral white matter disease. Clinical correlation is suggested.  Follow-up to resolution is recommended to exclude alternate etiologies.    2/2 MRI head:   IMPRESSION:  1. Left thalamic hematoma with surrounding parenchymal edema and expansion of the left thalamus as further described above.  2. Microangiopathic ischemic disease. Chronic bilateral basal ganglia and pontine infarcts.  3. No hydrocephalus or acute vascular territorial infarct.    2/3 - Carotid dopplers:  No hemodynamically significant stenosis.    2/3 - Echocardiogram with bubble:  Mild concentric left ventricular hypertrophy. The left ventricular wall motion is normal. The left ventricular ejection fraction = 60-65%. The left atrial size is normal. Injection of agitated saline contrast documented no interatrial shunt. Right atrial size is normal. The right ventricle is normal in size and function. No evidence for any hemodynamically significant valvular disease.    Assessment & Plan:  51 year-old right handed woman with unknown medical history other than etoh abuse, depression, pica, and medication non-compliance presents on 2/2 with confusion and was found to have a left thalamic intracerebral hemorrhagic likely due to uncontrolled hypertension and multiple vascular risk factors.      -BP elevated today, started on amlodipine 5 mg, continue to monitor. Goal BP < 130/80  -no antiplatelet/anticoagulation/statin at this time given hemorrhage.  Would repeat ct head in four weeks to evaluate for resolution of hemorrhage, likely will start aspirin for secondary stroke prevention at that time  -continue on fluoxetine for hx depression and post-stroke motor recovery  -pt/ot/st following- recommended for VEDA  -pt evaluated by neuropsychology - severe cognitive impairment as well as depression, etoh abuse, pica.  Recommended psych evaluation.  -evaluated by psych - appreciate recs   -on thiamine and folate for hx of ETOH abuse   -scds for DVT prophylaxis     -stroke work up complete, pt medically ready for rehab, would follow up as an outpatient in 4 weeks post discharge

## 2017-02-09 NOTE — PROGRESS NOTE ADULT - ASSESSMENT
52yo F w/ unknown PMH other than Etoh abuse, found to have left thalamic hemorrhage with local edema and mass effect on CT head with Major neurocognitive disorder likely secondary to alcohol abuse and intra-cranial hemorrhage.

## 2017-02-09 NOTE — PROGRESS NOTE ADULT - SUBJECTIVE AND OBJECTIVE BOX
Patient in bed in gowns with family at bedside. Patient and family were eating pizza and chicken wings.        Allergies: No Known Allergies    MEDICATIONS  (STANDING):  multivitamin 1Tablet(s) Oral daily  thiamine 100milliGRAM(s) Oral daily  folic acid 1milliGRAM(s) Oral daily  FLUoxetine 20milliGRAM(s) Oral daily  amLODIPine   Tablet 5milliGRAM(s) Oral daily    MEDICATIONS  (PRN):  nicotine  Polacrilex Gum 2milliGRAM(s) Oral every 2 hours PRN Cigarette craving    LABS  TSH: 2.8  B12: 373  Folate: <2 LOW    Vital Signs Last 24 Hrs  T(C): 37.2, Max: 37.2 (02-09 @ 09:05)  T(F): 98.9, Max: 98.9 (02-09 @ 09:05)  HR: 76 (71 - 80)  BP: 160/89 (135/84 - 176/96)  RR: 18 (18 - 18)  SpO2: 96% (95% - 98%)    Gen Appearance: Overweight, in hospital gown.  Abnormal Movements: None  Speech: increased latency, soft volume   Thought Process: Loose. Difficulty responding to questions  TC: Denies SI/HI, AH/VH  Mood: "Fine."  Affect: Flat, stable.  Consciousness/orientation: AAO x 4  Memory: Grossly intact  Attention/Concentration: Impaired  Fund of Knowledge: Adequate.  Insight: Poor  Judgment: Fair    Suicide Risk Assessment: Denied suicidal ideation/plan/intent. No hx. Patient in bed in gowns with family at bedside. Patient and family were eating pizza and chicken wings. Patient knew she had been in the hospital for about a week. When asked she stated that she slept "good", her mood is "OK" and that her appetite is "less than usual because breakfast is limited". Patient stated that she was told that she was going to a rehab today. Patient denied SI and HI. Nursing reports that patient has been medically stable and not a behavioral issue.    Allergies: No Known Allergies    MEDICATIONS  (STANDING):  multivitamin 1Tablet(s) Oral daily  thiamine 100milliGRAM(s) Oral daily  folic acid 1milliGRAM(s) Oral daily  FLUoxetine 20milliGRAM(s) Oral daily  amLODIPine   Tablet 5milliGRAM(s) Oral daily    MEDICATIONS  (PRN):  nicotine  Polacrilex Gum 2milliGRAM(s) Oral every 2 hours PRN Cigarette craving    LABS  TSH: 2.8  B12: 373  Folate: <2 LOW    Vital Signs Last 24 Hrs  T(C): 37.2, Max: 37.2 (02-09 @ 09:05)  T(F): 98.9, Max: 98.9 (02-09 @ 09:05)  HR: 76 (71 - 80)  BP: 160/89 (135/84 - 176/96)  RR: 18 (18 - 18)  SpO2: 96% (95% - 98%)    Gen Appearance: Overweight, in hospital gown.  Abnormal Movements: None  Speech: increased latency, soft volume   Thought Process: Loose. Difficulty responding to questions  TC: Denies SI/HI, AH/VH  Mood: "Fine."  Affect: Flat, stable.  Consciousness/orientation: AAO x 4  Memory: Grossly intact  Attention/Concentration: Impaired  Fund of Knowledge: Adequate.  Insight: Poor  Judgment: Fair    Suicide Risk Assessment: Denied suicidal ideation/plan/intent. No hx.

## 2017-02-10 VITALS
DIASTOLIC BLOOD PRESSURE: 87 MMHG | HEART RATE: 74 BPM | TEMPERATURE: 98 F | RESPIRATION RATE: 17 BRPM | SYSTOLIC BLOOD PRESSURE: 131 MMHG | OXYGEN SATURATION: 98 %

## 2017-02-10 RX ORDER — AMLODIPINE BESYLATE 2.5 MG/1
1 TABLET ORAL
Qty: 0 | Refills: 0 | COMMUNITY
Start: 2017-02-10

## 2017-02-10 RX ORDER — METOPROLOL TARTRATE 50 MG
1 TABLET ORAL
Qty: 0 | Refills: 0 | COMMUNITY

## 2017-02-10 RX ADMIN — Medication 1 MILLIGRAM(S): at 11:19

## 2017-02-10 RX ADMIN — AMLODIPINE BESYLATE 5 MILLIGRAM(S): 2.5 TABLET ORAL at 05:11

## 2017-02-10 RX ADMIN — Medication 1 TABLET(S): at 11:19

## 2017-02-10 RX ADMIN — Medication 20 MILLIGRAM(S): at 11:19

## 2017-02-10 RX ADMIN — Medication 100 MILLIGRAM(S): at 11:19

## 2017-02-10 NOTE — PROGRESS NOTE ADULT - SUBJECTIVE AND OBJECTIVE BOX
Stroke Neurology Follow-Up Visit    Overnight:    Exam:  T(F): 98.4, Max: 99.1 (02-09 @ 16:16)  HR: 74 (72 - 76)  BP: 131/87 (131/87 - 160/89)  RR: 17 (17 - 18)  SpO2: 98% (95% - 98%)    Gen: Lying in bed, calm, cooperative, in NAD.   Neuro:  Mental status: Awake, alert and oriented to self and [facility].  Naming and repetition intact. Requires repeated instructions for complex commands.  Follows all simple commands.      Cranial nerves: Pupils equally round and reactive to light. Visual fields full, no nystagmus, extraocular muscles intact, V1 through V3 intact bilaterally and symmetric, face symmetric, hearing intact to finger rub, palate elevation symmetric, tongue was midline, sternocleidomastoid/shoulder shrug strength bilaterally 5/5.    Motor:  Normal bulk and tone.  strength strong bilaterally. RUE 5/5 bicep, 4+/5 tricep, 4+/5 deltoid. Left arm 5/5, right leg 4+/5, left leg 5/5.    Sensation: Intact and symmetric to light touch intact bilaterally.  No neglect.     MEDICATIONS  (STANDING):  multivitamin 1Tablet(s) Oral daily  thiamine 100milliGRAM(s) Oral daily  folic acid 1milliGRAM(s) Oral daily  FLUoxetine 20milliGRAM(s) Oral daily  amLODIPine   Tablet 5milliGRAM(s) Oral daily    MEDICATIONS  (PRN):  nicotine  Polacrilex Gum 2milliGRAM(s) Oral every 2 hours PRN Cigarette craving      Labs:    Cholesterol, Serum: 113 mg/dL  HDL Cholesterol, Serum: 34 mg/dL  Triglycerides, Serum: 60 mg/dL  Hemoglobin A1C, Whole Blood: 5.0 %  Thyroid Stimulating Hormone, Serum: 0.832 uIU/mL      Radiology:  2/2 CT head:   IMPRESSION: Left thalamic hemorrhage with local edema and mass effect. This is felt most likely to be secondary to hypertensive angiopathy given that there are other more chronic sites of small vessel lacunar injury with advanced cerebral white matter disease. Clinical correlation is suggested.  Follow-up to resolution is recommended to exclude alternate etiologies.    2/2 MRI head:   IMPRESSION:  1. Left thalamic hematoma with surrounding parenchymal edema and expansion of the left thalamus as further described above.  2. Microangiopathic ischemic disease. Chronic bilateral basal ganglia and pontine infarcts.  3. No hydrocephalus or acute vascular territorial infarct.    2/3 - Carotid dopplers:  No hemodynamically significant stenosis.    2/3 - Echocardiogram with bubble:  Mild concentric left ventricular hypertrophy. The left ventricular wall motion is normal. The left ventricular ejection fraction = 60-65%. The left atrial size is normal. Injection of agitated saline contrast documented no interatrial shunt. Right atrial size is normal. The right ventricle is normal in size and function. No evidence for any hemodynamically significant valvular disease.    Assessment & Plan:  51 year-old right handed woman with unknown medical history other than etoh abuse, depression, pica, and medication non-compliance presents on 2/2 with confusion and was found to have a left thalamic intracerebral hemorrhagic likely due to uncontrolled hypertension and multiple vascular risk factors.      NOTE IN PROGRESS   -Goal BP < 130/80  -no antiplatelet/anticoagulation/statin at this time given hemorrhage.  Would repeat ct head in four weeks to evaluate for resolution of hemorrhage, likely will start aspirin for secondary stroke prevention at that time  -continue on fluoxetine for hx depression and post-stroke motor recovery  -pt/ot/st following- recommended for VEDA  -pt evaluated by neuropsychology - severe cognitive impairment as well as depression, etoh abuse, pica.  Recommended psych evaluation.  -evaluated by psych - appreciate recs   -on thiamine and folate for hx of ETOH abuse   -scds for DVT prophylaxis     -stroke work up complete, pt medically ready for rehab, would follow up as an outpatient in 4 weeks post discharge

## 2017-02-14 DIAGNOSIS — R41.82 ALTERED MENTAL STATUS, UNSPECIFIED: ICD-10-CM

## 2017-02-14 DIAGNOSIS — I10 ESSENTIAL (PRIMARY) HYPERTENSION: ICD-10-CM

## 2017-02-14 DIAGNOSIS — Z91.19 PATIENT'S NONCOMPLIANCE WITH OTHER MEDICAL TREATMENT AND REGIMEN: ICD-10-CM

## 2017-02-14 DIAGNOSIS — N17.9 ACUTE KIDNEY FAILURE, UNSPECIFIED: ICD-10-CM

## 2017-02-14 DIAGNOSIS — F10.10 ALCOHOL ABUSE, UNCOMPLICATED: ICD-10-CM

## 2017-02-14 DIAGNOSIS — K76.0 FATTY (CHANGE OF) LIVER, NOT ELSEWHERE CLASSIFIED: ICD-10-CM

## 2017-02-14 DIAGNOSIS — M79.671 PAIN IN RIGHT FOOT: ICD-10-CM

## 2017-02-14 DIAGNOSIS — G93.6 CEREBRAL EDEMA: ICD-10-CM

## 2017-02-14 DIAGNOSIS — F32.9 MAJOR DEPRESSIVE DISORDER, SINGLE EPISODE, UNSPECIFIED: ICD-10-CM

## 2017-02-14 DIAGNOSIS — I61.9 NONTRAUMATIC INTRACEREBRAL HEMORRHAGE, UNSPECIFIED: ICD-10-CM

## 2017-02-14 DIAGNOSIS — D64.9 ANEMIA, UNSPECIFIED: ICD-10-CM

## 2017-12-18 PROCEDURE — 82140 ASSAY OF AMMONIA: CPT

## 2017-12-18 PROCEDURE — 84300 ASSAY OF URINE SODIUM: CPT

## 2017-12-18 PROCEDURE — 72170 X-RAY EXAM OF PELVIS: CPT

## 2017-12-18 PROCEDURE — 85240 CLOT FACTOR VIII AHG 1 STAGE: CPT

## 2017-12-18 PROCEDURE — 97162 PT EVAL MOD COMPLEX 30 MIN: CPT

## 2017-12-18 PROCEDURE — 84100 ASSAY OF PHOSPHORUS: CPT

## 2017-12-18 PROCEDURE — 83935 ASSAY OF URINE OSMOLALITY: CPT

## 2017-12-18 PROCEDURE — 85303 CLOT INHIBIT PROT C ACTIVITY: CPT

## 2017-12-18 PROCEDURE — 97116 GAIT TRAINING THERAPY: CPT

## 2017-12-18 PROCEDURE — 87040 BLOOD CULTURE FOR BACTERIA: CPT

## 2017-12-18 PROCEDURE — 76770 US EXAM ABDO BACK WALL COMP: CPT

## 2017-12-18 PROCEDURE — 93880 EXTRACRANIAL BILAT STUDY: CPT

## 2017-12-18 PROCEDURE — 71045 X-RAY EXAM CHEST 1 VIEW: CPT

## 2017-12-18 PROCEDURE — 82728 ASSAY OF FERRITIN: CPT

## 2017-12-18 PROCEDURE — 82585 ASSAY OF CRYOFIBRINOGEN: CPT

## 2017-12-18 PROCEDURE — 70551 MRI BRAIN STEM W/O DYE: CPT

## 2017-12-18 PROCEDURE — 80061 LIPID PANEL: CPT

## 2017-12-18 PROCEDURE — 84466 ASSAY OF TRANSFERRIN: CPT

## 2017-12-18 PROCEDURE — 85307 ASSAY ACTIVATED PROTEIN C: CPT

## 2017-12-18 PROCEDURE — 85598 HEXAGNAL PHOSPH PLTLT NEUTRL: CPT

## 2017-12-18 PROCEDURE — 85670 THROMBIN TIME PLASMA: CPT

## 2017-12-18 PROCEDURE — 96374 THER/PROPH/DIAG INJ IV PUSH: CPT

## 2017-12-18 PROCEDURE — 85384 FIBRINOGEN ACTIVITY: CPT

## 2017-12-18 PROCEDURE — 93306 TTE W/DOPPLER COMPLETE: CPT

## 2017-12-18 PROCEDURE — 86140 C-REACTIVE PROTEIN: CPT

## 2017-12-18 PROCEDURE — 81003 URINALYSIS AUTO W/O SCOPE: CPT

## 2017-12-18 PROCEDURE — 80053 COMPREHEN METABOLIC PANEL: CPT

## 2017-12-18 PROCEDURE — 85306 CLOT INHIBIT PROT S FREE: CPT

## 2017-12-18 PROCEDURE — 85300 ANTITHROMBIN III ACTIVITY: CPT

## 2017-12-18 PROCEDURE — 84425 ASSAY OF VITAMIN B-1: CPT

## 2017-12-18 PROCEDURE — 81001 URINALYSIS AUTO W/SCOPE: CPT

## 2017-12-18 PROCEDURE — 82607 VITAMIN B-12: CPT

## 2017-12-18 PROCEDURE — 73630 X-RAY EXAM OF FOOT: CPT

## 2017-12-18 PROCEDURE — 86146 BETA-2 GLYCOPROTEIN ANTIBODY: CPT

## 2017-12-18 PROCEDURE — 97112 NEUROMUSCULAR REEDUCATION: CPT

## 2017-12-18 PROCEDURE — 80307 DRUG TEST PRSMV CHEM ANLYZR: CPT

## 2017-12-18 PROCEDURE — 96375 TX/PRO/DX INJ NEW DRUG ADDON: CPT

## 2017-12-18 PROCEDURE — 85652 RBC SED RATE AUTOMATED: CPT

## 2017-12-18 PROCEDURE — 92610 EVALUATE SWALLOWING FUNCTION: CPT | Mod: GN

## 2017-12-18 PROCEDURE — 85730 THROMBOPLASTIN TIME PARTIAL: CPT

## 2017-12-18 PROCEDURE — 83090 ASSAY OF HOMOCYSTEINE: CPT

## 2017-12-18 PROCEDURE — 85613 RUSSELL VIPER VENOM DILUTED: CPT

## 2017-12-18 PROCEDURE — 70450 CT HEAD/BRAIN W/O DYE: CPT

## 2017-12-18 PROCEDURE — 84155 ASSAY OF PROTEIN SERUM: CPT

## 2017-12-18 PROCEDURE — 80048 BASIC METABOLIC PNL TOTAL CA: CPT

## 2017-12-18 PROCEDURE — 85610 PROTHROMBIN TIME: CPT

## 2017-12-18 PROCEDURE — 85027 COMPLETE CBC AUTOMATED: CPT

## 2017-12-18 PROCEDURE — 82746 ASSAY OF FOLIC ACID SERUM: CPT

## 2017-12-18 PROCEDURE — 97161 PT EVAL LOW COMPLEX 20 MIN: CPT

## 2017-12-18 PROCEDURE — 86038 ANTINUCLEAR ANTIBODIES: CPT

## 2017-12-18 PROCEDURE — 83735 ASSAY OF MAGNESIUM: CPT

## 2017-12-18 PROCEDURE — 83550 IRON BINDING TEST: CPT

## 2017-12-18 PROCEDURE — 97530 THERAPEUTIC ACTIVITIES: CPT

## 2017-12-18 PROCEDURE — 86334 IMMUNOFIX E-PHORESIS SERUM: CPT

## 2017-12-18 PROCEDURE — 85302 CLOT INHIBIT PROT C ANTIGEN: CPT

## 2017-12-18 PROCEDURE — 84443 ASSAY THYROID STIM HORMONE: CPT

## 2017-12-18 PROCEDURE — 82595 ASSAY OF CRYOGLOBULIN: CPT

## 2017-12-18 PROCEDURE — 99285 EMERGENCY DEPT VISIT HI MDM: CPT | Mod: 25

## 2017-12-18 PROCEDURE — 36415 COLL VENOUS BLD VENIPUNCTURE: CPT

## 2017-12-18 PROCEDURE — 82570 ASSAY OF URINE CREATININE: CPT

## 2017-12-18 PROCEDURE — 83036 HEMOGLOBIN GLYCOSYLATED A1C: CPT

## 2017-12-18 PROCEDURE — 84165 PROTEIN E-PHORESIS SERUM: CPT

## 2018-12-07 NOTE — PROGRESS NOTE ADULT - PROBLEM SELECTOR PLAN 3
DISPLAY PLAN FREE TEXT DISPLAY PLAN FREE TEXT DISPLAY PLAN FREE TEXT DISPLAY PLAN FREE TEXT DISPLAY PLAN FREE TEXT DISPLAY PLAN FREE TEXT DISPLAY PLAN FREE TEXT DISPLAY PLAN FREE TEXT DISPLAY PLAN FREE TEXT DISPLAY PLAN FREE TEXT DISPLAY PLAN FREE TEXT DISPLAY PLAN FREE TEXT DISPLAY PLAN FREE TEXT DISPLAY PLAN FREE TEXT DISPLAY PLAN FREE TEXT DISPLAY PLAN FREE TEXT DISPLAY PLAN FREE TEXT DISPLAY PLAN FREE TEXT DISPLAY PLAN FREE TEXT DISPLAY PLAN FREE TEXT DISPLAY PLAN FREE TEXT DISPLAY PLAN FREE TEXT DISPLAY PLAN FREE TEXT DASH/TLC diet.  Replete lytes prn for K<4, Mg<2. DISPLAY PLAN FREE TEXT

## 2019-11-11 NOTE — PROGRESS NOTE ADULT - PROBLEM/PLAN-2
DISPLAY PLAN FREE TEXT
0

## 2020-08-12 NOTE — DISCHARGE NOTE ADULT - CAREGIVER OBTAIN DETAILS
What Type Of Note Output Would You Prefer (Optional)?: Standard Output How Severe Is Your Rash?: moderate Is This A New Presentation, Or A Follow-Up?: Rash Additional History: Pt was using round [employer] for her yard. States it got on her. Wanting to know if there is any testing that can be done to see if there is cancer anywhere. altered mental status

## 2020-08-12 NOTE — ED ADULT NURSE NOTE - THOUGHTS OF SUICIDE/SELF-HARM YN, MLM
Additional Notes: Have you tested positive for COVID-19 or been diagnosed with COVID 19?\\nAre you waiting for pending COVID-19 test results?\\nDo you have any of the following symptoms:  fever, cough, difficulty breathing, muscles aches, soreness, loss of smell or taste, sore throat, nausea/diarrhea?\\nHave you been in close contact with anyone who has COVID-19?\\nHave you traveled outside the [place] or to [place], [place], [place], [place], [place], [place] or [place] in the last 14 days?\\nIs there any reason that you cannot wear a mask that covers your nose and mouth for the entire length of your clinical visit?\\nIf the answer is yes to any of the above questions please let us know immediately.
Detail Level: Detailed
No

## 2023-07-27 NOTE — DIETITIAN INITIAL EVALUATION ADULT. - NUTRITIONGOAL OUTCOME1
Body Location Override (Optional - Billing Will Still Be Based On Selected Body Map Location If Applicable): mid upper back Detail Level: Simple Depth Of Biopsy: dermis Was A Bandage Applied: Yes Size Of Lesion In Cm: 0.5 X Size Of Lesion In Cm: 0 Biopsy Type: H and E Biopsy Method: 15 blade Anesthesia Type: 1% lidocaine with epinephrine Hemostasis: Electrocautery Wound Care: Bacitracin Dressing: bandage Destruction After The Procedure: No Type Of Destruction Used: Curettage Curettage Text: The wound bed was treated with curettage after the biopsy was performed. Cryotherapy Text: The wound bed was treated with cryotherapy after the biopsy was performed. Electrodesiccation Text: The wound bed was treated with electrodesiccation after the biopsy was performed. Electrodesiccation And Curettage Text: The wound bed was treated with electrodesiccation and curettage after the biopsy was performed. Silver Nitrate Text: The wound bed was treated with silver nitrate after the biopsy was performed. Lab: [id number] Lab Facility: [id number] Path Notes (To The Dermatopathologist): Size: 0.5 Consent: Written consent was obtained and risks were reviewed including but not limited to scarring, infection, bleeding, scabbing, incomplete removal, nerve damage and allergy to anesthesia. Post-Care Instructions: I reviewed with the patient in detail post-care instructions. Patient is to keep the biopsy site dry overnight, and then apply bacitracin twice daily until healed. Patient may apply hydrogen peroxide soaks to remove any crusting. Notification Instructions: Patient will be notified of biopsy results. However, patient instructed to call the office if not contacted within 2 weeks. Billing Type: Third-Party Bill Information: Selecting Yes will display possible errors in your note based on the variables you have selected. This validation is only offered as a suggestion for you. PLEASE NOTE THAT THE VALIDATION TEXT WILL BE REMOVED WHEN YOU FINALIZE YOUR NOTE. IF YOU WANT TO FAX A PRELIMINARY NOTE YOU WILL NEED TO TOGGLE THIS TO 'NO' IF YOU DO NOT WANT IT IN YOUR FAXED NOTE. Body Location Override (Optional - Billing Will Still Be Based On Selected Body Map Location If Applicable): left lower lateral thigh Body Location Override (Optional - Billing Will Still Be Based On Selected Body Map Location If Applicable): left upper shin Body Location Override (Optional - Billing Will Still Be Based On Selected Body Map Location If Applicable): right lateral forehead Initiate nutrition within 24-48hrs via most appropriate route to meet >75% nutritional needs

## 2023-08-08 NOTE — ED PROVIDER NOTE - MUSCULOSKELETAL, MLM
Spine appears normal, range of motion is not limited, + Right lateral foot ttp and slight redness.
MST score > 2

## 2025-02-07 ENCOUNTER — APPOINTMENT (OUTPATIENT)
Dept: ELECTROPHYSIOLOGY | Facility: CLINIC | Age: 60
End: 2025-02-07

## 2025-02-07 PROBLEM — Z00.00 ENCOUNTER FOR PREVENTIVE HEALTH EXAMINATION: Status: ACTIVE | Noted: 2025-02-07

## 2025-02-07 NOTE — ED ADULT NURSE NOTE - NS ED NOTE ABUSE RESPONSE YN
Pt arrives via wheelchair to ED for c/o generalized weakness, increased thirst, increased incontinence/urination. Pt had abnormal urine results at the  in [place] and elevated BG levels. Denies falls. Aox4, speaking in full sentences.    no

## 2025-04-09 NOTE — PROGRESS NOTE ADULT - PROBLEM SELECTOR PLAN 4
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  Avoid antiplatelets and anticoagulation in setting of ICH.  Dispo: Pending social work for long term placement; Patient meets criteria for Major Neurocognitive disorder - multiple etiologies.  ETOH abuse/withdrawl and recent stoke may by acutely affecting cognitive functioning and will need outpatient follow up; pending discharge to [facility] today.    FULL CODE Cane

## 2025-07-20 NOTE — PROGRESS NOTE ADULT - NSHPATTENDINGPLANDISCUSS_GEN_ALL_CORE
Sore Throat    Viral sore throats can last 7-10 days    Pain related to strep throat should start to improve within a few days of taking antibiotics    If you have been prescribed antibiotics, take the full course of medication, as prescribed. If you stop the antibiotics early, the infection can come back and be harder to treat.    Once you are feeling better and have been fever free for at least 24 hours, it is recommended to change your toothbrush and toothpaste in order to avoid reinfecting yourself. If you have been prescribed antibiotics, you should be on antibiotics for at least 48 hours before changing your toothbrush and toothpaste.     The best way to control sore throat pain is with Tylenol (acetaminophen) and Advil (ibuprofen) around the clock (unless you are allergic). *Do not use Advil or other NSAIDs if you are on blood thinners.     Gargling with salt water, drinking warm tea with honey, Cepacol lozenges, and Chloraseptic or Mucinex throat spray can all help with pain. Throat Calm is an over the counter product that helps with pain and laryngitis.    Avoid sharing food/drinks or close contact (kissing) until your symptoms resolve.    You can return to work/normal activities once you are fever free for 24 hours. If have been prescribed antibiotics, you should both be fever free and taking the antibiotics for 24 hours before returning to work or resuming your normal activities.     If you experience severe sore throat pain with inability to speak, swallow, or open the mouth please go immediately to the ER for further evaluation    You are considered contagious until you have been on antibiotics for 24 hours and are fever free for 24 hours  
7 [employer]
7 [employer]